# Patient Record
Sex: FEMALE | Race: WHITE | Employment: OTHER | URBAN - METROPOLITAN AREA
[De-identification: names, ages, dates, MRNs, and addresses within clinical notes are randomized per-mention and may not be internally consistent; named-entity substitution may affect disease eponyms.]

---

## 2018-01-29 LAB
ABO + RH BLD: NORMAL
ABO + RH BLD: NORMAL
BLD GP AB SCN SERPL QL: NEGATIVE
CULTURE MICRO: NORMAL
HBV SURFACE AG SERPL QL IA: NEGATIVE
HIV 1+2 AB+HIV1 P24 AG SERPL QL IA: NONREACTIVE
RUBELLA ABY IGG: 1.56
RUBELLA ANTIBODY IGG QUANTITATIVE: NORMAL IU/ML
T PALLIDUM IGG SER QL: NONREACTIVE

## 2018-04-02 ENCOUNTER — PRE VISIT (OUTPATIENT)
Dept: MATERNAL FETAL MEDICINE | Facility: CLINIC | Age: 38
End: 2018-04-02

## 2018-04-09 ENCOUNTER — OFFICE VISIT (OUTPATIENT)
Dept: MATERNAL FETAL MEDICINE | Facility: CLINIC | Age: 38
End: 2018-04-09
Attending: OBSTETRICS & GYNECOLOGY
Payer: COMMERCIAL

## 2018-04-09 ENCOUNTER — HOSPITAL ENCOUNTER (OUTPATIENT)
Dept: ULTRASOUND IMAGING | Facility: CLINIC | Age: 38
Discharge: HOME | End: 2018-04-09
Attending: OBSTETRICS & GYNECOLOGY | Admitting: OBSTETRICS & GYNECOLOGY
Payer: COMMERCIAL

## 2018-04-09 DIAGNOSIS — O26.90 PREGNANCY RELATED CONDITION, ANTEPARTUM: ICD-10-CM

## 2018-04-09 DIAGNOSIS — O09.522 ELDERLY MULTIGRAVIDA IN SECOND TRIMESTER: ICD-10-CM

## 2018-04-09 PROCEDURE — 76811 OB US DETAILED SNGL FETUS: CPT

## 2018-04-09 NOTE — MR AVS SNAPSHOT
After Visit Summary   4/9/2018    Nicky Dorman    MRN: [id number]           Patient Information     Date Of Birth          1980        Visit Information        Provider Department      4/9/2018 10:00 AM Cristiano Bar MD [facility] Maternal Fetal Medicine [facility]        Today's Diagnoses     Evaluate anatomy not seen on prior sonogram    -  1    Elderly multigravida in second trimester           Follow-ups after your visit        Your next 10 appointments already scheduled     Apr 30, 2018  8:45 AM CDT   (Arrive by 8:30 AM)   MFM US COMPRE SINGLE F/U with [id number]   [facility] Maternal Fetal Medicine Ultrasound - [facility] ([facility] [place])    [address] [place] [address]  [address]  [facility] [id number] [phone]           Wear comfortable clothes and leave your valuables at home.            Apr 30, 2018  9:15 AM CDT   [facility]   [facility] Maternal Fetal Medicine [facility] ([facility] [place])    [address]  [address]  [facility] [id number] [phone]           Please arrive at the time given for your first appointment. This visit is used internally to schedule the physician's time during your ultrasound.            Sep 04, 2018   Procedure with Nancy Oakes MD    Birthplace (--)    [address]  [facility] [id number] [phone]              Future tests that were ordered for you today     Open Future Orders        Priority Expected Expires Ordered    MFM US Comprehensive Single F/U Routine 4/30/2018 4/9/2019 4/9/2018            Who to contact     If you have questions or need follow up information about today's clinic visit or your schedule please contact [facility] MATERNAL FETAL MEDIC[facility] directly at [phone].  Normal or non-critical lab and imaging results will be communicated to you by MyChart, letter or phone within 4 business days after the clinic has received the results. If you do not  "hear from us within 7 days, please contact the clinic through [employer] or phone. If you have a critical or abnormal lab result, we will notify you by phone as soon as possible.  Submit refill requests through [employer] or call your pharmacy and they will forward the refill request to us. Please allow 3 business days for your refill to be completed.          Additional Information About Your Visit        [email or web address] Moderna Information     [employer] lets you send messages to your doctor, view your test results, renew your prescriptions, schedule appointments and more. To sign up, go to [email or web address] . Click on \"Log in\" on the left side of the screen, which will take you to the Welcome page. Then click on \"Sign up Now\" on the right side of the page.     You will be asked to enter the access code listed below, as well as some personal information. Please follow the directions to create your username and password.     Your access code is: [email or web address]  Expires: 2018 11:50 AM     Your access code will  in 90 days. If you need help or a new code, please call your [facility] clinic or [phone].        Care EveryWhere ID     This is your Care EveryWhere ID. This could be used by other organizations to access your [facility] medical records  [id number]        Your Vitals Were     Last Period                   2017            Blood Pressure from Last 3 Encounters:   No data found for BP    Weight from Last 3 Encounters:   No data found for Wt               Primary Care Provider Fax #    Physician No Ref-Primary [phone]       No address on file        Equal Access to Services     [facility] AH: Hadii aad ku hadasho Soomaali, waaxda luqadaha, qaybta kaalmada adeegyada, iesha mcintyre . So [facility] [phone].    ATENCIÓN: Si habla español, tiene a russell disposición servicios gratuitos de asistencia lingüística. Llame al [phone].    We comply with applicable federal civil rights laws and " [place] laws. We do not discriminate on the basis of race, color, national origin, age, disability, sex, sexual orientation, or gender identity.            Thank you!     Thank you for choosing [facility]  for your care. Our goal is always to provide you with excellent care. Hearing back from our patients is one way we can continue to improve our services. Please take a few minutes to complete the written survey that you may receive in the mail after your visit with us. Thank you!             Your Updated Medication List - Protect others around you: Learn how to safely use, store and throw away your medicines at www.disposemymeds.org.      Notice  As of 4/9/2018 11:50 AM    You have not been prescribed any medications.

## 2018-04-09 NOTE — PROGRESS NOTES
Please see full imaging report from ViewPoint program under imaging tab.        Cristiano Bar MD  Maternal Fetal Medicine

## 2018-04-30 ENCOUNTER — HOSPITAL ENCOUNTER (OUTPATIENT)
Dept: ULTRASOUND IMAGING | Facility: CLINIC | Age: 38
Discharge: HOME | End: 2018-04-30
Attending: OBSTETRICS & GYNECOLOGY | Admitting: OBSTETRICS & GYNECOLOGY
Payer: COMMERCIAL

## 2018-04-30 ENCOUNTER — OFFICE VISIT (OUTPATIENT)
Dept: MATERNAL FETAL MEDICINE | Facility: CLINIC | Age: 38
End: 2018-04-30
Attending: OBSTETRICS & GYNECOLOGY
Payer: COMMERCIAL

## 2018-04-30 DIAGNOSIS — Z03.73 SUSPECTED FETAL ANOMALY NOT FOUND: Primary | ICD-10-CM

## 2018-04-30 PROCEDURE — 76816 OB US FOLLOW-UP PER FETUS: CPT

## 2018-04-30 NOTE — MR AVS SNAPSHOT
"              After Visit Summary   4/30/2018    Nicky Dorman    MRN: [id number]           Patient Information     Date Of Birth          1980        Visit Information        Provider Department      4/30/2018 9:15 AM Saloni Mendes MD [facility] Maternal Fetal Medicine [facility]        Today's Diagnoses     Suspected fetal anomaly not found    -  1       Follow-ups after your visit        Your next 10 appointments already scheduled     Sep 04, 2018   Procedure with Nancy Oakes MD    Birthplace (--)    [address]  [facility] [address] [phone]              Who to contact     If you have questions or need follow up information about today's clinic visit or your schedule please contact [facility] MATERNAL FETAL MEDICINE [facility] directly at [phone].  Normal or non-critical lab and imaging results will be communicated to you by Digital[employer]ohart, letter or phone within 4 business days after the clinic has received the results. If you do not hear from us within 7 days, please contact the clinic through [employer] or phone. If you have a critical or abnormal lab result, we will notify you by phone as soon as possible.  Submit refill requests through [employer] or call your pharmacy and they will forward the refill request to us. Please allow 3 business days for your refill to be completed.          Additional Information About Your Visit        MyChart Information     [employer] lets you send messages to your doctor, view your test results, renew your prescriptions, schedule appointments and more. To sign up, go to [email or web address] . Click on \"Log in\" on the left side of the screen, which will take you to the Welcome page. Then click on \"Sign up Now\" on the right side of the page.     You will be asked to enter the access code listed below, as well as some personal information. Please follow the directions to create your username and password.     Your access code is: " [email or web address]  Expires: 2018 11:50 AM     Your access code will  in 90 days. If you need help or a new code, please call your [facility] clinic or [phone].        Care EveryWhere ID     This is your Care EveryWhere ID. This could be used by other organizations to access your [facility] medical records  [id number]        Your Vitals Were     Last Period                   2017            Blood Pressure from Last 3 Encounters:   No data found for BP    Weight from Last 3 Encounters:   No data found for Wt              Today, you had the following     No orders found for display       Primary Care Provider Fax #    Physician No Ref-Primary [phone]       No address on file        Equal Access to Services     JENNIFER WALKER : Huan Whittaker, eric olivas, jeromy rivas, iesha mcintyre . So [facility] [phone].    ATENCIÓN: Si habla español, tiene a russell disposición servicios gratuitos de asistencia lingüística. Llame al [phone].    We comply with applicable federal civil rights laws and [place] laws. We do not discriminate on the basis of race, color, national origin, age, disability, sex, sexual orientation, or gender identity.            Thank you!     Thank you for choosing [facility]  for your care. Our goal is always to provide you with excellent care. Hearing back from our patients is one way we can continue to improve our services. Please take a few minutes to complete the written survey that you may receive in the mail after your visit with us. Thank you!             Your Updated Medication List - Protect others around you: Learn how to safely use, store and throw away your medicines at www.disposemymeds.org.      Notice  As of 2018 11:59 PM    You have not been prescribed any medications.

## 2018-05-01 NOTE — PROGRESS NOTES
"Please see \"Imaging\" tab under \"Chart Review\" for details of today's visit.    Saloni Mendes    "

## 2018-08-13 LAB — GROUP B STREP PCR: POSITIVE

## 2018-09-03 ENCOUNTER — HOSPITAL ENCOUNTER (INPATIENT)
Facility: CLINIC | Age: 38
LOS: 2 days | Discharge: HOME HEALTH | End: 2018-09-05
Attending: OBSTETRICS & GYNECOLOGY | Admitting: OBSTETRICS & GYNECOLOGY
Payer: COMMERCIAL

## 2018-09-03 ENCOUNTER — ANESTHESIA (OUTPATIENT)
Dept: OBGYN | Facility: CLINIC | Age: 38
End: 2018-09-03
Payer: COMMERCIAL

## 2018-09-03 ENCOUNTER — ANESTHESIA EVENT (OUTPATIENT)
Dept: OBGYN | Facility: CLINIC | Age: 38
End: 2018-09-03
Payer: COMMERCIAL

## 2018-09-03 DIAGNOSIS — Z98.891 S/P CESAREAN SECTION: Primary | ICD-10-CM

## 2018-09-03 LAB
ABO + RH BLD: NORMAL
ABO + RH BLD: NORMAL
BLD GP AB SCN SERPL QL: NORMAL
BLOOD BANK CMNT PATIENT-IMP: NORMAL
HGB BLD-MCNC: 11.2 G/DL (ref 11.7–15.7)
SPECIMEN EXP DATE BLD: NORMAL

## 2018-09-03 PROCEDURE — 86850 RBC ANTIBODY SCREEN: CPT | Performed by: OBSTETRICS & GYNECOLOGY

## 2018-09-03 PROCEDURE — 86780 TREPONEMA PALLIDUM: CPT | Performed by: OBSTETRICS & GYNECOLOGY

## 2018-09-03 PROCEDURE — 85018 HEMOGLOBIN: CPT | Performed by: OBSTETRICS & GYNECOLOGY

## 2018-09-03 PROCEDURE — 25000128 H RX IP 250 OP 636: Performed by: OBSTETRICS & GYNECOLOGY

## 2018-09-03 PROCEDURE — 86901 BLOOD TYPING SEROLOGIC RH(D): CPT | Performed by: OBSTETRICS & GYNECOLOGY

## 2018-09-03 PROCEDURE — 25000128 H RX IP 250 OP 636

## 2018-09-03 PROCEDURE — 71000013 ZZH RECOVERY PHASE 1 LEVEL 1 EA ADDTL HR: Performed by: OBSTETRICS & GYNECOLOGY

## 2018-09-03 PROCEDURE — 71000012 ZZH RECOVERY PHASE 1 LEVEL 1 FIRST HR: Performed by: OBSTETRICS & GYNECOLOGY

## 2018-09-03 PROCEDURE — 25000125 ZZHC RX 250: Performed by: OBSTETRICS & GYNECOLOGY

## 2018-09-03 PROCEDURE — 59025 FETAL NON-STRESS TEST: CPT

## 2018-09-03 PROCEDURE — 25000128 H RX IP 250 OP 636: Performed by: NURSE ANESTHETIST, CERTIFIED REGISTERED

## 2018-09-03 PROCEDURE — 27210794 ZZH OR GENERAL SUPPLY STERILE: Performed by: OBSTETRICS & GYNECOLOGY

## 2018-09-03 PROCEDURE — G0463 HOSPITAL OUTPT CLINIC VISIT: HCPCS | Mod: 25

## 2018-09-03 PROCEDURE — 25000132 ZZH RX MED GY IP 250 OP 250 PS 637

## 2018-09-03 PROCEDURE — 25000132 ZZH RX MED GY IP 250 OP 250 PS 637: Performed by: OBSTETRICS & GYNECOLOGY

## 2018-09-03 PROCEDURE — 25000125 ZZHC RX 250: Performed by: NURSE ANESTHETIST, CERTIFIED REGISTERED

## 2018-09-03 PROCEDURE — 37000008 ZZH ANESTHESIA TECHNICAL FEE, 1ST 30 MIN: Performed by: OBSTETRICS & GYNECOLOGY

## 2018-09-03 PROCEDURE — 36000058 ZZH SURGERY LEVEL 3 EA 15 ADDTL MIN: Performed by: OBSTETRICS & GYNECOLOGY

## 2018-09-03 PROCEDURE — 36000056 ZZH SURGERY LEVEL 3 1ST 30 MIN: Performed by: OBSTETRICS & GYNECOLOGY

## 2018-09-03 PROCEDURE — 37000009 ZZH ANESTHESIA TECHNICAL FEE, EACH ADDTL 15 MIN: Performed by: OBSTETRICS & GYNECOLOGY

## 2018-09-03 PROCEDURE — 86900 BLOOD TYPING SEROLOGIC ABO: CPT | Performed by: OBSTETRICS & GYNECOLOGY

## 2018-09-03 PROCEDURE — 36415 COLL VENOUS BLD VENIPUNCTURE: CPT | Performed by: OBSTETRICS & GYNECOLOGY

## 2018-09-03 PROCEDURE — 12000037 ZZH R&B POSTPARTUM INTERMEDIATE

## 2018-09-03 RX ORDER — LIDOCAINE 40 MG/G
CREAM TOPICAL
Status: DISCONTINUED | OUTPATIENT
Start: 2018-09-03 | End: 2018-09-05 | Stop reason: HOSPADM

## 2018-09-03 RX ORDER — ACETAMINOPHEN 325 MG/1
TABLET ORAL
Status: DISCONTINUED
Start: 2018-09-03 | End: 2018-09-03 | Stop reason: HOSPADM

## 2018-09-03 RX ORDER — SODIUM CHLORIDE, SODIUM LACTATE, POTASSIUM CHLORIDE, CALCIUM CHLORIDE 600; 310; 30; 20 MG/100ML; MG/100ML; MG/100ML; MG/100ML
INJECTION, SOLUTION INTRAVENOUS CONTINUOUS
Status: DISCONTINUED | OUTPATIENT
Start: 2018-09-03 | End: 2018-09-03

## 2018-09-03 RX ORDER — EPHEDRINE SULFATE 50 MG/ML
5 INJECTION, SOLUTION INTRAMUSCULAR; INTRAVENOUS; SUBCUTANEOUS
Status: DISCONTINUED | OUTPATIENT
Start: 2018-09-03 | End: 2018-09-05 | Stop reason: HOSPADM

## 2018-09-03 RX ORDER — MISOPROSTOL 200 UG/1
400 TABLET ORAL
Status: DISCONTINUED | OUTPATIENT
Start: 2018-09-03 | End: 2018-09-05 | Stop reason: HOSPADM

## 2018-09-03 RX ORDER — AMOXICILLIN 250 MG
1 CAPSULE ORAL 2 TIMES DAILY PRN
Status: DISCONTINUED | OUTPATIENT
Start: 2018-09-03 | End: 2018-09-05 | Stop reason: HOSPADM

## 2018-09-03 RX ORDER — OXYTOCIN/0.9 % SODIUM CHLORIDE 30/500 ML
340 PLASTIC BAG, INJECTION (ML) INTRAVENOUS CONTINUOUS PRN
Status: DISCONTINUED | OUTPATIENT
Start: 2018-09-03 | End: 2018-09-05 | Stop reason: HOSPADM

## 2018-09-03 RX ORDER — ACETAMINOPHEN 325 MG/1
975 TABLET ORAL ONCE
Status: COMPLETED | OUTPATIENT
Start: 2018-09-03 | End: 2018-09-03

## 2018-09-03 RX ORDER — ONDANSETRON 2 MG/ML
4 INJECTION INTRAMUSCULAR; INTRAVENOUS EVERY 6 HOURS PRN
Status: DISCONTINUED | OUTPATIENT
Start: 2018-09-03 | End: 2018-09-03

## 2018-09-03 RX ORDER — BUPIVACAINE HYDROCHLORIDE 7.5 MG/ML
INJECTION, SOLUTION INTRASPINAL PRN
Status: DISCONTINUED | OUTPATIENT
Start: 2018-09-03 | End: 2018-09-03

## 2018-09-03 RX ORDER — KETOROLAC TROMETHAMINE 30 MG/ML
INJECTION, SOLUTION INTRAMUSCULAR; INTRAVENOUS
Status: COMPLETED
Start: 2018-09-03 | End: 2018-09-03

## 2018-09-03 RX ORDER — OXYCODONE HYDROCHLORIDE 5 MG/1
5-10 TABLET ORAL
Status: DISCONTINUED | OUTPATIENT
Start: 2018-09-03 | End: 2018-09-05 | Stop reason: HOSPADM

## 2018-09-03 RX ORDER — CEFAZOLIN SODIUM 2 G/100ML
2 INJECTION, SOLUTION INTRAVENOUS
Status: COMPLETED | OUTPATIENT
Start: 2018-09-03 | End: 2018-09-03

## 2018-09-03 RX ORDER — ONDANSETRON 2 MG/ML
4 INJECTION INTRAMUSCULAR; INTRAVENOUS EVERY 6 HOURS PRN
Status: DISCONTINUED | OUTPATIENT
Start: 2018-09-03 | End: 2018-09-05 | Stop reason: HOSPADM

## 2018-09-03 RX ORDER — CEFAZOLIN SODIUM 1 G/3ML
1 INJECTION, POWDER, FOR SOLUTION INTRAMUSCULAR; INTRAVENOUS SEE ADMIN INSTRUCTIONS
Status: DISCONTINUED | OUTPATIENT
Start: 2018-09-03 | End: 2018-09-03

## 2018-09-03 RX ORDER — LANOLIN 100 %
OINTMENT (GRAM) TOPICAL
Status: DISCONTINUED | OUTPATIENT
Start: 2018-09-03 | End: 2018-09-05 | Stop reason: HOSPADM

## 2018-09-03 RX ORDER — DEXTROSE, SODIUM CHLORIDE, SODIUM LACTATE, POTASSIUM CHLORIDE, AND CALCIUM CHLORIDE 5; .6; .31; .03; .02 G/100ML; G/100ML; G/100ML; G/100ML; G/100ML
INJECTION, SOLUTION INTRAVENOUS CONTINUOUS
Status: DISCONTINUED | OUTPATIENT
Start: 2018-09-03 | End: 2018-09-05 | Stop reason: HOSPADM

## 2018-09-03 RX ORDER — ONDANSETRON 2 MG/ML
INJECTION INTRAMUSCULAR; INTRAVENOUS PRN
Status: DISCONTINUED | OUTPATIENT
Start: 2018-09-03 | End: 2018-09-03

## 2018-09-03 RX ORDER — ACETAMINOPHEN 325 MG/1
975 TABLET ORAL EVERY 8 HOURS
Status: DISCONTINUED | OUTPATIENT
Start: 2018-09-03 | End: 2018-09-05 | Stop reason: HOSPADM

## 2018-09-03 RX ORDER — AMOXICILLIN 250 MG
2 CAPSULE ORAL 2 TIMES DAILY PRN
Status: DISCONTINUED | OUTPATIENT
Start: 2018-09-03 | End: 2018-09-05 | Stop reason: HOSPADM

## 2018-09-03 RX ORDER — KETOROLAC TROMETHAMINE 30 MG/ML
30 INJECTION, SOLUTION INTRAMUSCULAR; INTRAVENOUS EVERY 6 HOURS
Status: COMPLETED | OUTPATIENT
Start: 2018-09-03 | End: 2018-09-04

## 2018-09-03 RX ORDER — SIMETHICONE 80 MG
80 TABLET,CHEWABLE ORAL 4 TIMES DAILY PRN
Status: DISCONTINUED | OUTPATIENT
Start: 2018-09-03 | End: 2018-09-05 | Stop reason: HOSPADM

## 2018-09-03 RX ORDER — CEFAZOLIN SODIUM 2 G/100ML
INJECTION, SOLUTION INTRAVENOUS
Status: DISCONTINUED
Start: 2018-09-03 | End: 2018-09-03 | Stop reason: HOSPADM

## 2018-09-03 RX ORDER — OXYTOCIN/0.9 % SODIUM CHLORIDE 30/500 ML
PLASTIC BAG, INJECTION (ML) INTRAVENOUS
Status: COMPLETED
Start: 2018-09-03 | End: 2018-09-03

## 2018-09-03 RX ORDER — BISACODYL 10 MG
10 SUPPOSITORY, RECTAL RECTAL DAILY PRN
Status: DISCONTINUED | OUTPATIENT
Start: 2018-09-05 | End: 2018-09-05 | Stop reason: HOSPADM

## 2018-09-03 RX ORDER — HYDROCORTISONE 2.5 %
CREAM (GRAM) TOPICAL 3 TIMES DAILY PRN
Status: DISCONTINUED | OUTPATIENT
Start: 2018-09-03 | End: 2018-09-05 | Stop reason: HOSPADM

## 2018-09-03 RX ORDER — NALBUPHINE HYDROCHLORIDE 10 MG/ML
2.5-5 INJECTION, SOLUTION INTRAMUSCULAR; INTRAVENOUS; SUBCUTANEOUS EVERY 6 HOURS PRN
Status: DISCONTINUED | OUTPATIENT
Start: 2018-09-03 | End: 2018-09-05 | Stop reason: HOSPADM

## 2018-09-03 RX ORDER — NALOXONE HYDROCHLORIDE 0.4 MG/ML
.1-.4 INJECTION, SOLUTION INTRAMUSCULAR; INTRAVENOUS; SUBCUTANEOUS
Status: DISCONTINUED | OUTPATIENT
Start: 2018-09-03 | End: 2018-09-05 | Stop reason: HOSPADM

## 2018-09-03 RX ORDER — BUPIVACAINE HYDROCHLORIDE 7.5 MG/ML
INJECTION, SOLUTION INTRASPINAL
Status: DISCONTINUED
Start: 2018-09-03 | End: 2018-09-03 | Stop reason: HOSPADM

## 2018-09-03 RX ORDER — MORPHINE SULFATE 1 MG/ML
INJECTION, SOLUTION EPIDURAL; INTRATHECAL; INTRAVENOUS
Status: DISCONTINUED
Start: 2018-09-03 | End: 2018-09-03 | Stop reason: HOSPADM

## 2018-09-03 RX ORDER — CITRIC ACID/SODIUM CITRATE 334-500MG
SOLUTION, ORAL ORAL
Status: COMPLETED
Start: 2018-09-03 | End: 2018-09-03

## 2018-09-03 RX ORDER — OXYTOCIN/0.9 % SODIUM CHLORIDE 30/500 ML
PLASTIC BAG, INJECTION (ML) INTRAVENOUS CONTINUOUS PRN
Status: DISCONTINUED | OUTPATIENT
Start: 2018-09-03 | End: 2018-09-03

## 2018-09-03 RX ORDER — MORPHINE SULFATE 1 MG/ML
INJECTION, SOLUTION EPIDURAL; INTRATHECAL; INTRAVENOUS PRN
Status: DISCONTINUED | OUTPATIENT
Start: 2018-09-03 | End: 2018-09-03

## 2018-09-03 RX ORDER — OXYTOCIN 10 [USP'U]/ML
10 INJECTION, SOLUTION INTRAMUSCULAR; INTRAVENOUS
Status: DISCONTINUED | OUTPATIENT
Start: 2018-09-03 | End: 2018-09-05 | Stop reason: HOSPADM

## 2018-09-03 RX ORDER — OXYTOCIN/0.9 % SODIUM CHLORIDE 30/500 ML
100 PLASTIC BAG, INJECTION (ML) INTRAVENOUS CONTINUOUS
Status: DISCONTINUED | OUTPATIENT
Start: 2018-09-03 | End: 2018-09-05 | Stop reason: HOSPADM

## 2018-09-03 RX ORDER — HYDROMORPHONE HYDROCHLORIDE 1 MG/ML
.3-.5 INJECTION, SOLUTION INTRAMUSCULAR; INTRAVENOUS; SUBCUTANEOUS EVERY 30 MIN PRN
Status: DISCONTINUED | OUTPATIENT
Start: 2018-09-03 | End: 2018-09-05 | Stop reason: HOSPADM

## 2018-09-03 RX ORDER — CITRIC ACID/SODIUM CITRATE 334-500MG
30 SOLUTION, ORAL ORAL
Status: COMPLETED | OUTPATIENT
Start: 2018-09-03 | End: 2018-09-03

## 2018-09-03 RX ORDER — FERROUS SULFATE 325(65) MG
325 TABLET ORAL
Status: DISCONTINUED | OUTPATIENT
Start: 2018-09-03 | End: 2018-09-05 | Stop reason: HOSPADM

## 2018-09-03 RX ORDER — ACETAMINOPHEN 325 MG/1
650 TABLET ORAL EVERY 4 HOURS PRN
Status: DISCONTINUED | OUTPATIENT
Start: 2018-09-06 | End: 2018-09-05 | Stop reason: HOSPADM

## 2018-09-03 RX ORDER — IBUPROFEN 400 MG/1
800 TABLET, FILM COATED ORAL EVERY 6 HOURS PRN
Status: DISCONTINUED | OUTPATIENT
Start: 2018-09-03 | End: 2018-09-05 | Stop reason: HOSPADM

## 2018-09-03 RX ADMIN — Medication 340 ML/HR: at 05:22

## 2018-09-03 RX ADMIN — KETOROLAC TROMETHAMINE 30 MG: 30 INJECTION, SOLUTION INTRAMUSCULAR at 14:09

## 2018-09-03 RX ADMIN — SODIUM CHLORIDE, POTASSIUM CHLORIDE, SODIUM LACTATE AND CALCIUM CHLORIDE 1000 ML: 600; 310; 30; 20 INJECTION, SOLUTION INTRAVENOUS at 03:54

## 2018-09-03 RX ADMIN — KETOROLAC TROMETHAMINE 30 MG: 30 INJECTION, SOLUTION INTRAMUSCULAR at 07:10

## 2018-09-03 RX ADMIN — BUPIVACAINE HYDROCHLORIDE IN DEXTROSE 12 MG: 7.5 INJECTION, SOLUTION SUBARACHNOID at 05:00

## 2018-09-03 RX ADMIN — SODIUM CHLORIDE, POTASSIUM CHLORIDE, SODIUM LACTATE AND CALCIUM CHLORIDE: 600; 310; 30; 20 INJECTION, SOLUTION INTRAVENOUS at 04:17

## 2018-09-03 RX ADMIN — OXYTOCIN-SODIUM CHLORIDE 0.9% IV SOLN 30 UNIT/500ML 100 ML/HR: 30-0.9/5 SOLUTION at 09:41

## 2018-09-03 RX ADMIN — MORPHINE SULFATE 0.2 MG: 1 INJECTION, SOLUTION EPIDURAL; INTRATHECAL; INTRAVENOUS at 05:00

## 2018-09-03 RX ADMIN — ACETAMINOPHEN 975 MG: 325 TABLET, FILM COATED ORAL at 20:20

## 2018-09-03 RX ADMIN — Medication 30 ML: at 04:20

## 2018-09-03 RX ADMIN — SODIUM CITRATE AND CITRIC ACID MONOHYDRATE 30 ML: 500; 334 SOLUTION ORAL at 04:20

## 2018-09-03 RX ADMIN — KETOROLAC TROMETHAMINE 30 MG: 30 INJECTION, SOLUTION INTRAMUSCULAR at 18:17

## 2018-09-03 RX ADMIN — PHENYLEPHRINE HYDROCHLORIDE 100 MCG: 10 INJECTION, SOLUTION INTRAMUSCULAR; INTRAVENOUS; SUBCUTANEOUS at 05:13

## 2018-09-03 RX ADMIN — SODIUM CHLORIDE, SODIUM LACTATE, POTASSIUM CHLORIDE, CALCIUM CHLORIDE AND DEXTROSE MONOHYDRATE: 5; 600; 310; 30; 20 INJECTION, SOLUTION INTRAVENOUS at 15:14

## 2018-09-03 RX ADMIN — SODIUM CHLORIDE, POTASSIUM CHLORIDE, SODIUM LACTATE AND CALCIUM CHLORIDE: 600; 310; 30; 20 INJECTION, SOLUTION INTRAVENOUS at 04:25

## 2018-09-03 RX ADMIN — SENNOSIDES AND DOCUSATE SODIUM 1 TABLET: 8.6; 5 TABLET ORAL at 20:21

## 2018-09-03 RX ADMIN — PHENYLEPHRINE HYDROCHLORIDE 100 MCG: 10 INJECTION, SOLUTION INTRAMUSCULAR; INTRAVENOUS; SUBCUTANEOUS at 05:24

## 2018-09-03 RX ADMIN — SODIUM CHLORIDE, POTASSIUM CHLORIDE, SODIUM LACTATE AND CALCIUM CHLORIDE: 600; 310; 30; 20 INJECTION, SOLUTION INTRAVENOUS at 05:03

## 2018-09-03 RX ADMIN — CEFAZOLIN SODIUM 2 G: 2 INJECTION, SOLUTION INTRAVENOUS at 05:02

## 2018-09-03 RX ADMIN — ACETAMINOPHEN 975 MG: 325 TABLET, FILM COATED ORAL at 12:34

## 2018-09-03 RX ADMIN — ONDANSETRON 4 MG: 2 INJECTION INTRAMUSCULAR; INTRAVENOUS at 05:23

## 2018-09-03 RX ADMIN — ACETAMINOPHEN 975 MG: 325 TABLET, FILM COATED ORAL at 03:54

## 2018-09-03 ASSESSMENT — ACTIVITIES OF DAILY LIVING (ADL)
FALL_HISTORY_WITHIN_LAST_SIX_MONTHS: NO
RETIRED_COMMUNICATION: 0-->UNDERSTANDS/COMMUNICATES WITHOUT DIFFICULTY
AMBULATION: 0-->INDEPENDENT
RETIRED_EATING: 0-->INDEPENDENT
TOILETING: 0-->INDEPENDENT
SWALLOWING: 0-->SWALLOWS FOODS/LIQUIDS WITHOUT DIFFICULTY
BATHING: 0-->INDEPENDENT
COGNITION: 0 - NO COGNITION ISSUES REPORTED
TRANSFERRING: 0-->INDEPENDENT
DRESS: 0-->INDEPENDENT

## 2018-09-03 ASSESSMENT — ENCOUNTER SYMPTOMS: SEIZURES: 0

## 2018-09-03 ASSESSMENT — LIFESTYLE VARIABLES: TOBACCO_USE: 0

## 2018-09-03 NOTE — OP NOTE
Brief Op Note    PreOp Dx: 1. IUP at 39w1d, 2. Hx of C/S, desires repeat, 3. Active labor  PostOp Dx: Same  Procedure: 1. RLTCS via [place], 2. Scar excision  Surgeon: Melissa  Assistant: OR staff  Anesthesia: Spinal w/ Duramorph  EBL: 800cc  Complications: None apparent  Specimen: Cord blood  Findings: Liveborn male infant, cephalic. Wt 7-6, Apgars 8/9. Amniotic fluid with light meconium. Placenta intact with 3VC. Uterus, tubes, ovaries nl. Minimal scar tissue noted.    Tamara Torres MD  #[id number]

## 2018-09-03 NOTE — LACTATION NOTE
"Initial visit with Nicky, SARAH and baby boy.    Breastfeeding general information reviewed.   Advised to breastfeed exclusively, on demand, avoid pacifiers, bottles and formula unless medically indicated.  Encouraged rooming in, skin to skin, feeding on demand 8-12x/day or sooner if baby cues.  Explained benefits of holding and skin to skin.  Encouraged lots of skin to skin. Instructed on hand expression.   Continues to nurse well per mom. Nicky states she had a lower milk supply with her daughter \"nursed all the time\".  Nicky took Fenugreek.  Nicky has a Spectra pump at home and will follow up with Fariha.   No further questions at this time.   Will follow as needed.   Trini Son BSN, RN, PHN, RNC-MNN, IBCLC    "

## 2018-09-03 NOTE — IP AVS SNAPSHOT
MRN:[id number]                      After Visit Summary   9/3/2018    Nicky Dorman    MRN: [id number]           Thank you!     Thank you for choosing [facility] for your care. Our goal is always to provide you with excellent care. Hearing back from our patients is one way we can continue to improve our services. Please take a few minutes to complete the written survey that you may receive in the mail after you visit with us. Thank you!        Patient Information     Date Of Birth          1980        Designated Caregiver       Most Recent Value    Caregiver    Will someone help with your care after discharge? yes    Name of designated caregiver ok    Phone number of caregiver [phone]    Caregiver address same as pt      About your hospital stay     You were admitted on:  September 3, 2018 You last received care in the:  [address]    You were discharged on:  2018       Who to Call     For medical emergencies, please call 911.  For non-urgent questions about your medical care, please call your primary care provider or clinic, None  For questions related to your surgery, please call your surgery clinic        Attending Provider     Provider Specialty    Tamara Torres MD OB/Gyn       Primary Care Provider Fax #    Physician No Ref-Primary [phone]      After Care Instructions     Activity       Review discharge instructions            Diet       Resume previous diet            Discharge Instructions - Postpartum visit       Schedule postpartum visit with your provider and return to clinic in 2 and 8 weeks.                  Further instructions from your care team       Postop  Birth Instructions    Activity       Do not lift more than 10 pounds for 6 weeks after surgery.  Ask family and friends for help when you need it.    No driving until you have stopped taking your pain medications (usually two weeks after surgery).    No  heavy exercise or activity for 6 weeks.  Don't do anything that will put a strain on your surgery site.    Don't strain when using the toilet.  Your care team may prescribe a stool softener if you have problems with your bowel movements.     To care for your incision:       Keep the incision clean and dry.    Do not soak your incision in water. No swimming or hot tubs until it has fully healed. You may soak in the bathtub if the water level is below your incision.    Do not use peroxide, gel, cream, lotion, or ointment on your incision.    Adjust your clothes to avoid pressure on your surgery site (check the elastic in your underwear for example).     You may see a small amount of clear or pink drainage and this is normal.  Check with your health care provider:       If the drainage increases or has an odor.    If the incision reddens, you have swelling, or develop a rash.    If you have increased pain and the medicine we prescribed doesn't help.    If you have a fever above 100.4 F (38 C) with or without chills when placing thermometer under your tongue.   The area around your incision (surgery wound), will feel numb.  This is normal. The numbness should go away in less than a year.     Keep your hands clean:  Always wash your hands before touching your incision (surgery wound). This helps reduce your risk of infection. If your hands aren't dirty, you may use an alcohol hand-rub to clean your hands. Keep your nails clean and short.    Call your healthcare provider if you have any of these symptoms:       You soak a sanitary pad with blood within 1 hour, or you see blood clots larger than a golf ball.    Bleeding that lasts more than 6 weeks.    Vaginal discharge that smells bad.    Severe pain, cramping or tenderness in your lower belly area.    A need to urinate more frequently (use the toilet more often), more urgently (use the toilet very quickly), or it burns when you urinate.    Nausea and  "vomiting.    Redness, swelling or pain around a vein in your leg.    Problems breastfeeding or a red or painful area on your breast.    Chest pain and cough or are gasping for air.    Problems with coping with sadness, anxiety or depression. If you have concerns about hurting yourself or the baby, call your provider immediately.      You have questions or concerns after you return home.                  Pending Results     No orders found from 2018 to 2018.            Statement of Approval     Ordered          18 0837  I have reviewed and agree with all the recommendations and orders detailed in this document.  EFFECTIVE NOW     Comments:  DC  if baby able to be DC'd, otherwise DC .   Approved and electronically signed by:  Tamara Torres MD             Admission Information     Date & Time Provider Department Dept. Phone    9/3/2018 Tamara Torres MD [address] [phone]      Your Vitals Were     Blood Pressure Temperature Respirations Height Weight Last Period    111/61 98.4  F (36.9  C) (Oral) 16 1.829 m (6') 83.9 kg (185 lb) 2017    Pulse Oximetry BMI (Body Mass Index)                100% 25.09 kg/m2          MyChart Information     [employer] lets you send messages to your doctor, view your test results, renew your prescriptions, schedule appointments and more. To sign up, go to [email or web address] . Click on \"Log in\" on the left side of the screen, which will take you to the Welcome page. Then click on \"Sign up Now\" on the right side of the page.     You will be asked to enter the access code listed below, as well as some personal information. Please follow the directions to create your username and password.     Your access code is: [email or web address]  Expires: 2018  1:49 PM     Your access code will  in 90 days. If you need help or a new code, please call your [facility] clinic or [phone].        Care EveryWhere ID     This is " your Care EveryWhere ID. This could be used by other organizations to access your [facility] medical records  [id number]        Equal Access to Services     JENNIFER WALKER : Hadii aad ku hadarturwilman Whittaker, sandroda paulallanha, jeromy kashayla rivas, iesha may. So [facility] [phone].    ATENCIÓN: Si habla español, tiene a russell disposición servicios gratuitos de asistencia lingüística. Llame al [phone].    We comply with applicable federal civil rights laws and [place] laws. We do not discriminate on the basis of race, color, national origin, age, disability, sex, sexual orientation, or gender identity.               Review of your medicines      START taking        Dose / Directions    oxyCODONE IR 5 MG tablet   Commonly known as:  ROXICODONE        Dose:  5-10 mg   Take 1-2 tablets (5-10 mg) by mouth every 3 hours as needed for other (pain control or improvement in physical function. Hold dose for analgesic side effects.)   Quantity:  25 tablet   Refills:  0         CONTINUE these medicines which have NOT CHANGED        Dose / Directions    IRON SUPPLEMENT PO        Dose:  325 mg   Take 325 mg by mouth daily (with breakfast)   Refills:  0       PNV PO        Dose:  1 tablet   Take 1 tablet by mouth daily   Refills:  0            Where to get your medicines      Some of these will need a paper prescription and others can be bought over the counter. Ask your nurse if you have questions.     Bring a paper prescription for each of these medications     oxyCODONE IR 5 MG tablet                Protect others around you: Learn how to safely use, store and throw away your medicines at www.disposemymeds.org.        Information about OPIOIDS     PRESCRIPTION OPIOIDS: WHAT YOU NEED TO KNOW   We gave you an opioid (narcotic) pain medicine. It is important to manage your pain, but opioids are not always the best choice. You should first try all the other options your care team gave you. Take this  medicine for as short a time (and as few doses) as possible.    Some activities can increase your pain, such as bandage changes or therapy sessions. It may help to take your pain medicine 30 to 60 minutes before these activities. Reduce your stress by getting enough sleep, working on hobbies you enjoy and practicing relaxation or meditation. Talk to your care team about ways to manage your pain beyond prescription opioids.    These medicines have risks:    DO NOT drive when on new or higher doses of pain medicine. These medicines can affect your alertness and reaction times, and you could be arrested for driving under the influence (DUI). If you need to use opioids long-term, talk to your care team about driving.    DO NOT operate heavy machinery    DO NOT do any other dangerous activities while taking these medicines.    DO NOT drink any alcohol while taking these medicines.     If the opioid prescribed includes acetaminophen, DO NOT take with any other medicines that contain acetaminophen. Read all labels carefully. Look for the word  acetaminophen  or  Tylenol.  Ask your pharmacist if you have questions or are unsure.    You can get addicted to pain medicines, especially if you have a history of addiction (chemical, alcohol or substance dependence). Talk to your care team about ways to reduce this risk.    All opioids tend to cause constipation. Drink plenty of water and eat foods that have a lot of fiber, such as fruits, vegetables, prune juice, apple juice and high-fiber cereal. Take a laxative (Miralax, milk of magnesia, Colace, Senna) if you don t move your bowels at least every other day. Other side effects include upset stomach, sleepiness, dizziness, throwing up, tolerance (needing more of the medicine to have the same effect), physical dependence and slowed breathing.    Store your pills in a secure place, locked if possible. We will not replace any lost or stolen medicine. If you don t finish your  medicine, please throw away (dispose) as directed by your pharmacist. The [place] Pollution Control Agency has more information about safe disposal: https://www.pca.Count includes the [facility].[place].us/living-green/managing-unwanted-medications             Medication List: This is a list of all your medications and when to take them. Check marks below indicate your daily home schedule. Keep this list as a reference.      Medications           Morning Afternoon Evening Bedtime As Needed    IRON SUPPLEMENT PO   Take 325 mg by mouth daily (with breakfast)   Last time this was given:  325 mg on 9/5/2018  9:19 AM                                oxyCODONE IR 5 MG tablet   Commonly known as:  ROXICODONE   Take 1-2 tablets (5-10 mg) by mouth every 3 hours as needed for other (pain control or improvement in physical function. Hold dose for analgesic side effects.)   Last time this was given:  5 mg on 9/5/2018  1:28 PM                                PNV PO   Take 1 tablet by mouth daily

## 2018-09-03 NOTE — PLAN OF CARE
Data: Patient presented to [facility] at 0315.   Reason for maternal/fetal assessment per patient is Contractions  Patient is a . Prenatal record reviewed.   Gestational Age 39w1d. VSS. Fetal movement present. Patient states that her contractions started around midnight and that they have been getting closer and stronger. Patient denies vaginal discharge, pelvic pressure, UTI symptoms, GI problems, bloody show, vaginal bleeding, edema, headache, visual disturbances, epigastric or URQ pain, abdominal pain, rupture of membranes. Support person present.  Action: Verbal consent for EFM. Triage assessment completed. EFM applied for fetal wellbeing. Uterine assessment completed; contractions noted about every 2-4 minutes, moderate and patient breathing through them. SVE. Unable to assess dilation as patient's cervix very posterior and fetal station very low. Fetal assessment: Presumed adequate fetal oxygenation documented (see flow record).   Response: Dr. Torres informed of patient arrival, contractions, attempted SVE, and FHR tracing. Plan per provider is admit and prepare for repeat c/s. Patient verbalized agreement with plan. Report given to JANET Padilla RN.

## 2018-09-03 NOTE — OP NOTE
Procedure Date: 2018      DATE OF SERVICE: 2018.      PREOPERATIVE DIAGNOSES:   1.  Intrauterine pregnancy at 39 +1 weeks.   2.  History of  section, desires repeat.   3.  Active labor.      POSTOPERATIVE DIAGNOSES:   1.  Intrauterine pregnancy at 39 +1 weeks.   2.  History of  section, desires repeat.   3.  Active labor.      PROCEDURES:   1.  Repeat low transverse  section via Pfannenstiel.   2.  Scar excision.      SURGEON:  Tamara Torres MD      ASSISTANT:  OR staff.        ANESTHESIA:  Spinal with Duramorph.      ESTIMATED BLOOD LOSS: Was 800 mL.      COMPLICATIONS:  None apparent.      SPECIMENS:  Cord blood.      OPERATIVE INDICATIONS:  The patient is a 38-year-old G3, para 1-0-1-1, at 39 +1 weeks.  She had a history of  section with her first pregnancy due to breech presentation and planned repeat.  However, she presented to Labor and Delivery in active labor.  Risks, benefits and alternatives were discussed, including the risk of bleeding, infection and damage to surrounding structures such as bowel, bladder, blood vessels, etcetera.  All questions were answered and informed consent was obtained.  The patient desired to proceed.      FINDINGS:  Liveborn male infant in the cephalic presentation, weight 7 pounds 6 ounces, Apgars 8 and 9 at 1 and 5 minutes respectively.  Amniotic fluid was noted to have light meconium staining.  Placenta was intact with a 3-vessel cord.  Uterus, tubes and ovaries were normal.  There was minimal scar tissue noted.      DESCRIPTION OF PROCEDURE:  The patient was taken to the operating room where spinal anesthesia was undertaken.  She was then prepped and draped in the dorsal supine position with a leftward tilt.  Anesthesia was tested and found to be adequate.  Pfannenstiel skin incision was made with a scalpel excising the previous scar and the incision was extended to the level of the fascia.  Fascia was incised in the midline and  the incision extended laterally.  The fascial incision was elevated, and rectus muscles dissected off sharply and bluntly.  Rectus muscles were  at the midline and the peritoneum was identified and entered sharply.  Peritoneal incision was extended with good visualization of the bladder.  Bladder blade was inserted. A transverse uterine incision was made with the scalpel and extended bluntly.  Amniotomy was performed with an Allis clamp.  The infant was delivered from the cephalic presentation without difficulty.  Nose and mouth were bulb suctioned and cord was clamped and cut after a 30 second delay.  The placenta was then manually expressed and the uterus was exteriorized and cleared of all clots and debris.  The uterine incisions were reapproximated using 0 Vicryl in a running locked fashion.  A second layer of 0 Monocryl was placed in a horizontal imbricating fashion.  There was a small amount of bleeding noted at the right corner of the incision, which was hemostatic after a figure-of-eight of 0 Vicryl was placed.  Posterior cul-de-sac was irrigated and the uterus returned to the abdomen.  The gutters were irrigated and cleared of all clots.  The uterine incision was again reinspected and hemostasis ensured.  The peritoneum was brought together using 3-0 Vicryl in a running stitch.  Rectus muscles were inspected and made hemostatic with electrocautery.  Rectus muscles were then brought together using 0 Vicryl in interrupted sutures.  Fascial incision was brought together using 0 Vicryl in a running stitch.  Subcutaneous tissue was copiously irrigated and made hemostatic with electrocautery.  Subcutaneous tissue was then brought together using 3-0 chromic.  Skin was closed with staples.  Steri-Strips were placed between each.  The patient tolerated the procedure well.  All sponge, lap and instrument counts were correct x3.  She was taken to recovery room in stable condition.  She received 2 grams of  Ancef prior to the procedure for prophylaxis.  She is PENICILLIN ALLERGIC WITH HIVES  and tolerated a test dose of Ancef well.         TIFFANIE WHITE MD             D: 2018   T: 2018   MT: JOHN      Name:     JAYLA DE LA GARZA   MRN:      [id number]        Account:        [id number]   :      1980           Procedure Date: 2018      Document: [id number]

## 2018-09-03 NOTE — IP AVS SNAPSHOT
[address]    [place] [address]    Phone:  [phone]                                       After Visit Summary   9/3/2018    Nicky Dorman    MRN: [id number]           After Visit Summary Signature Page     I have received my discharge instructions, and my questions have been answered. I have discussed any challenges I see with this plan with the nurse or doctor.    ..........................................................................................................................................  Patient/Patient Representative Signature      ..........................................................................................................................................  Patient Representative Print Name and Relationship to Patient    ..................................................               ................................................  Date                                            Time    ..........................................................................................................................................  Reviewed by Signature/Title    ...................................................              ..............................................  Date                                                            Time          22EPIC Rev 08/18

## 2018-09-03 NOTE — H&P
H&P Update    Prenatal chart and H&P reviewed, no changes.    37yo  at 39w1d admitted in spontaneous labor. Hx of C/S with first pregnancy for breech, repeat planned for tomorrow. Discussed R/B including risk of bleeding, infxn, damage to surrounding structures (bowel, bladder, blood vessels, etc). All questions answered and informed consent obtained. Will proceed to OR ASAP for C/S.    Tamara Torres MD

## 2018-09-03 NOTE — ANESTHESIA CARE TRANSFER NOTE
Patient: Nicky Ponce Root    Procedure(s):  REPEAT  SECTION - Wound Class: II-Clean Contaminated    Diagnosis: PREVIOUS  Diagnosis Additional Information: No value filed.    Anesthesia Type:   Spinal     Note:  Airway :Room Air  Patient transferred to:Labor and Delivery  Comments: Transferred to RN. Patient awake and verbal. Spontaneous resp and on room air. Monitors and alarms on. VSS. Report given.      Vitals: (Last set prior to Anesthesia Care Transfer)    CRNA VITALS  9/3/2018 0528 - 9/3/2018 0606      9/3/2018             Resp Rate (set): 10                Electronically Signed By: ANIBAL Lee CRNA  September 3, 2018  6:06 AM

## 2018-09-03 NOTE — PLAN OF CARE
Problem: Patient Care Overview  Goal: Plan of Care/Patient Progress Review  Outcome: No Change  Pt. arrived on floor at 0800, oriented to unit and safety protocols. IV Pitocin infusing, gomez draining clear, narciso urine. Tolerated full liquid lunch. Drsg. with shadow drainage. Pt. up to BR with SBA, tolerated well. Vielka care done. Breastfeeding a baby boy with assist to position and check latch. Toradol and Tylenol for pain.

## 2018-09-03 NOTE — ANESTHESIA PREPROCEDURE EVALUATION
Anesthesia Evaluation     . Pt has had prior anesthetic.     No history of anesthetic complications          ROS/MED HX    ENT/Pulmonary:      (-) tobacco use and sleep apnea   Neurologic:      (-) seizures   Cardiovascular:        (-) hypertension   METS/Exercise Tolerance:     Hematologic:         Musculoskeletal:         GI/Hepatic:        (-) GERD and liver disease   Renal/Genitourinary:      (-) renal disease   Endo:      (-) Type II DM and thyroid disease   Psychiatric:         Infectious Disease:         Malignancy:         Other:                     Physical Exam  Normal systems: cardiovascular, pulmonary and dental    Airway   Mallampati: I  TM distance: >3 FB  Neck ROM: full    Dental     Cardiovascular       Pulmonary                     Anesthesia Plan      History & Physical Review  History and physical reviewed and following examination; no interval change.    ASA Status:  2 .    NPO Status:  > 8 hours    Plan for Spinal   PONV prophylaxis:  Ondansetron (or other 5HT-3)       Postoperative Care  Postoperative pain management:  Neuraxial analgesia, Multi-modal analgesia and Oral pain medications.      Consents  Anesthetic plan, risks, benefits and alternatives discussed with:  Spouse and Patient..        Procedure: Procedure(s):   SECTION  Preop diagnosis: PREVIOUS    Allergies   Allergen Reactions     Penicillins Hives     Past Medical History:   Diagnosis Date     Anemia      Past Surgical History:   Procedure Laterality Date      SECTION       wisdom teeth       Prior to Admission medications    Medication Sig Start Date End Date Taking? Authorizing Provider   Ferrous Sulfate (IRON SUPPLEMENT PO) Take 325 mg by mouth daily (with breakfast)   Yes Reported, Patient   Prenatal Vit w/Yz-Ndnxqnsfk-YL (PNV PO) Take 1 tablet by mouth daily   Yes Reported, Patient     Current Facility-Administered Medications Ordered in Epic   Medication Dose Route Frequency Last Rate Last Dose      acetaminophen (TYLENOL) 325 MG tablet             ceFAZolin (ANCEF) 1 g vial to attach to  ml bag for ADULT or 50 ml bag for PEDS  1 g Intravenous See Admin Instructions         ceFAZolin (ANCEF) intermittent infusion 2 g in 100 mL dextrose PRE-MIX  2 g Intravenous Pre-Op/Pre-procedure x 1 dose         ceFAZolin-dextrose (ANCEF) 2-4 GM/100ML-% infusion             lactated ringers infusion   Intravenous Continuous         NO Rho (D) immune globulin (RhoGam) needed - mother Rh POSITIVE   Does not apply Continuous PRN         ondansetron (ZOFRAN) injection 4 mg  4 mg Intravenous Q6H PRN         sodium citrate-citric acid (BICITRA) 500-334 MG/5ML solution             sodium citrate-citric acid (BICITRA) solution 30 mL  30 mL Oral Pre-Op/Pre-procedure x 1 dose         No current Epic-ordered outpatient prescriptions on file.     Wt Readings from Last 1 Encounters:   09/03/18 83.9 kg (185 lb)     Temp Readings from Last 1 Encounters:   No data found for Temp     BP Readings from Last 6 Encounters:   No data found for BP     Pulse Readings from Last 4 Encounters:   No data found for Pulse     Resp Readings from Last 1 Encounters:   No data found for Resp     SpO2 Readings from Last 1 Encounters:   No data found for SpO2     No results for input(s): NA, POTASSIUM, CHLORIDE, CO2, ANIONGAP, GLC, BUN, CR, DULCE MARIA in the last 01057 hours.  Recent Labs   Lab Test  09/03/18   0350   HGB  11.2*     No results for input(s): INR in the last 55304 hours.    Invalid input(s): APTT   RECENT LABS:   ECG:   ECHO:   CXR:                      .

## 2018-09-03 NOTE — ANESTHESIA PROCEDURE NOTES
Peripheral nerve/Neuraxial procedure note : intrathecal  Pre-Procedure  Performed by ADRIANA VILLA  Location: OR, OB      Pre-Anesthestic Checklist: patient identified, IV checked, site marked, risks and benefits discussed, informed consent, monitors and equipment checked, pre-op evaluation, at physician/surgeon's request and post-op pain management    Timeout  Correct Patient: Yes   Correct Procedure: Yes   Correct Site: Yes   Correct Laterality: Yes   Correct Position: Yes   Site Marked: Yes   .   Procedure Documentation    .    Procedure:    Intrathecal.  Insertion Site:L3-4  (midline approach)      Patient Prep;mask, sterile gloves, povidone-iodine 7.5% surgical scrub, patient draped.  .  Needle: Mary tip Spinal Needle (gauge): 25  Spinal/LP Needle Length (inches): 3 # of attempts: 1 and # of redirects:  Introducer used .       Assessment/Narrative  Paresthesias: No.  .  .  clear CSF fluid removed . Comments:  No complications

## 2018-09-03 NOTE — ANESTHESIA POSTPROCEDURE EVALUATION
Patient: Nicky Ponce Root    Procedure(s):  REPEAT  SECTION - Wound Class: II-Clean Contaminated    Diagnosis:PREVIOUS  Diagnosis Additional Information: No value filed.    Anesthesia Type:  Spinal    Note:  Anesthesia Post Evaluation    Patient location during evaluation: OB PACU  Patient participation: Able to fully participate in evaluation  Level of consciousness: awake and alert  Pain management: satisfactory to patient  Airway patency: patent  Cardiovascular status: hemodynamically stable  Respiratory status: acceptable and unassisted  Hydration status: balanced  PONV: none     Anesthetic complications: None          Last vitals:  Vitals:    18 0603 18 0615 18 0630   BP: 113/67 103/53 107/57   Resp: 11 8 10   Temp:      SpO2: 99% 96% 96%         Electronically Signed By: Emil Montano MD  September 3, 2018  6:41 AM

## 2018-09-04 LAB
HGB BLD-MCNC: 10.3 G/DL (ref 11.7–15.7)
T PALLIDUM AB SER QL: NONREACTIVE

## 2018-09-04 PROCEDURE — 36415 COLL VENOUS BLD VENIPUNCTURE: CPT | Performed by: OBSTETRICS & GYNECOLOGY

## 2018-09-04 PROCEDURE — 25000132 ZZH RX MED GY IP 250 OP 250 PS 637: Performed by: OBSTETRICS & GYNECOLOGY

## 2018-09-04 PROCEDURE — 85018 HEMOGLOBIN: CPT | Performed by: OBSTETRICS & GYNECOLOGY

## 2018-09-04 PROCEDURE — 25000128 H RX IP 250 OP 636: Performed by: OBSTETRICS & GYNECOLOGY

## 2018-09-04 PROCEDURE — 12000037 ZZH R&B POSTPARTUM INTERMEDIATE

## 2018-09-04 RX ADMIN — OXYCODONE HYDROCHLORIDE 5 MG: 5 TABLET ORAL at 09:06

## 2018-09-04 RX ADMIN — IBUPROFEN 800 MG: 400 TABLET ORAL at 11:54

## 2018-09-04 RX ADMIN — OXYCODONE HYDROCHLORIDE 5 MG: 5 TABLET ORAL at 13:03

## 2018-09-04 RX ADMIN — ACETAMINOPHEN 975 MG: 325 TABLET, FILM COATED ORAL at 13:03

## 2018-09-04 RX ADMIN — IBUPROFEN 800 MG: 400 TABLET ORAL at 18:06

## 2018-09-04 RX ADMIN — SENNOSIDES AND DOCUSATE SODIUM 1 TABLET: 8.6; 5 TABLET ORAL at 20:10

## 2018-09-04 RX ADMIN — ACETAMINOPHEN 975 MG: 325 TABLET, FILM COATED ORAL at 20:10

## 2018-09-04 RX ADMIN — ACETAMINOPHEN 975 MG: 325 TABLET, FILM COATED ORAL at 04:22

## 2018-09-04 RX ADMIN — FERROUS SULFATE TAB 325 MG (65 MG ELEMENTAL FE) 325 MG: 325 (65 FE) TAB at 09:04

## 2018-09-04 RX ADMIN — SENNOSIDES AND DOCUSATE SODIUM 1 TABLET: 8.6; 5 TABLET ORAL at 09:04

## 2018-09-04 RX ADMIN — KETOROLAC TROMETHAMINE 30 MG: 30 INJECTION, SOLUTION INTRAMUSCULAR at 00:05

## 2018-09-04 RX ADMIN — OXYCODONE HYDROCHLORIDE 5 MG: 5 TABLET ORAL at 21:25

## 2018-09-04 RX ADMIN — IBUPROFEN 800 MG: 400 TABLET ORAL at 05:38

## 2018-09-04 NOTE — PLAN OF CARE
Problem: Patient Care Overview  Goal: Plan of Care/Patient Progress Review  Outcome: Improving  Pt. stable, Tylenol, Ibuprofen and Oxycodone for pain. Inc. intact with staples and steri strips. Had a shower this am, abd. binder given to pt. She has amb. small distances in the adkins. Breastfeeding her baby boy ind. Lanolin for tender nipples. Hgb. 10.3. On oral iron daily for anemia hx.

## 2018-09-04 NOTE — PLAN OF CARE
Problem: Patient Care Overview  Goal: Plan of Care/Patient Progress Review  Outcome: Improving  Vital signs stable.  Unable to visualize incision; drainage on dressing marked by previous RN; no change.  Up to bathroom independently, voiding well, IV removed.  Pain well controlled, requesting prn pain meds as needed.  Working on breastfeeding  and  cares.  Questions and concerns addressed. Continue to monitor and notify MD as needed.

## 2018-09-04 NOTE — PLAN OF CARE
Problem: Patient Care Overview  Goal: Plan of Care/Patient Progress Review  Outcome: No Change  Pt ambulated to BR and in hallway. Tolerated well. IV SL'd. Drinking adequately. Sandee JACOBSON'd at 2050. DTV. Regular diet with no N/V. Good pain control with tylenol and toradol. Nursing going well when baby awake and interested.

## 2018-09-05 ENCOUNTER — DOCUMENTATION ONLY (OUTPATIENT)
Dept: CARE COORDINATION | Facility: CLINIC | Age: 38
End: 2018-09-05

## 2018-09-05 VITALS
BODY MASS INDEX: 25.06 KG/M2 | WEIGHT: 185 LBS | DIASTOLIC BLOOD PRESSURE: 61 MMHG | TEMPERATURE: 98.4 F | HEIGHT: 72 IN | RESPIRATION RATE: 16 BRPM | SYSTOLIC BLOOD PRESSURE: 111 MMHG | OXYGEN SATURATION: 100 %

## 2018-09-05 PROCEDURE — 25000132 ZZH RX MED GY IP 250 OP 250 PS 637: Performed by: OBSTETRICS & GYNECOLOGY

## 2018-09-05 RX ORDER — OXYCODONE HYDROCHLORIDE 5 MG/1
5-10 TABLET ORAL
Qty: 25 TABLET | Refills: 0 | Status: ON HOLD | OUTPATIENT
Start: 2018-09-05 | End: 2020-01-19

## 2018-09-05 RX ADMIN — SENNOSIDES AND DOCUSATE SODIUM 1 TABLET: 8.6; 5 TABLET ORAL at 09:18

## 2018-09-05 RX ADMIN — IBUPROFEN 800 MG: 400 TABLET ORAL at 06:39

## 2018-09-05 RX ADMIN — IBUPROFEN 800 MG: 400 TABLET ORAL at 12:38

## 2018-09-05 RX ADMIN — FERROUS SULFATE TAB 325 MG (65 MG ELEMENTAL FE) 325 MG: 325 (65 FE) TAB at 09:19

## 2018-09-05 RX ADMIN — OXYCODONE HYDROCHLORIDE 5 MG: 5 TABLET ORAL at 13:28

## 2018-09-05 RX ADMIN — OXYCODONE HYDROCHLORIDE 5 MG: 5 TABLET ORAL at 09:18

## 2018-09-05 RX ADMIN — IBUPROFEN 800 MG: 400 TABLET ORAL at 00:01

## 2018-09-05 RX ADMIN — OXYCODONE HYDROCHLORIDE 5 MG: 5 TABLET ORAL at 04:10

## 2018-09-05 RX ADMIN — ACETAMINOPHEN 975 MG: 325 TABLET, FILM COATED ORAL at 12:39

## 2018-09-05 RX ADMIN — OXYCODONE HYDROCHLORIDE 5 MG: 5 TABLET ORAL at 01:07

## 2018-09-05 RX ADMIN — ACETAMINOPHEN 975 MG: 325 TABLET, FILM COATED ORAL at 04:10

## 2018-09-05 NOTE — PROGRESS NOTES
POD#2    S: Doing well. Pain controlled. Voiding and ambulating. Tolerating regular diet. Bleeding is minimal. Would like to go home if baby able to be DC'd.    O: /61  Temp 98.4  F (36.9  C) (Oral)  Resp 16  Ht 1.829 m (6')  Wt 83.9 kg (185 lb)  LMP 12/03/2017  SpO2 100%  Breastfeeding? Unknown  BMI 25.09 kg/m2  Gen - NAD  Abd - FF, NT  Inc - C/D/I  Ext - NT    Hgb 10.3    A/P: 37yo POD#2 s/p LTCS  1. Routine cares  2. Home today if baby able to be DC'd, otherwise plan DC tomorrow    Tamara Torres MD

## 2018-09-05 NOTE — PROGRESS NOTES
[facility] will be sharing updates with you on Maternal Child Health Referral requests for home care services.  This is for care coordination purposes and alert you to referral status.  We received the referral for  Nicky Dorman; MRN [id number] and want to update you:    [facility] is unable to see patient for postpartum/  assessment and education due to patient insurance not contracted with [facility] for this service.  Patient advised to contact their insurance provider to determine if service is covered through another homecare agency. Offered option of private pay nurse assessment and education for mom and baby at service rate of 180.00 per couplet.  Provided call back information if private pay visit is requested. Patient declined need for home care visit, they will be seen in clinic tomorrow.     Sincerely [facility]  [place]  [phone]

## 2018-09-05 NOTE — PLAN OF CARE
Problem: Patient Care Overview  Goal: Plan of Care/Patient Progress Review  Outcome: Improving  Vital signs stable.  Incision clean, dry, intact with staples and steri strips.  Up independently.  Pain well controlled, requesting prn pain meds as needed.  Cluster feeding  and started pumping  0045. Hoping to discharge today. Questions and concerns addressed. Continue to monitor and notify MD as needed.

## 2018-09-05 NOTE — PLAN OF CARE
Problem: Patient Care Overview  Goal: Plan of Care/Patient Progress Review  Outcome: Adequate for Discharge Date Met: 09/05/18  D: VSS, assessments WDL.   I: Pt. received complete discharge paperwork and home medications as filled by discharge pharmacy.  Pt. was given times of last dose for all discharge medications in writing on discharge medication sheets.  Discharge teaching included home medication, pain management, activity restrictions, postpartum cares, and signs and symptoms of infection.    A: Discharge outcomes on care plan met.  Mother states understanding and comfort with self and baby cares.  P: Pt. discharged to home.  Pt. was discharged with baby, and bands were checked at time of discharge.  Pt. was accompanied by , nurse and baby, and left with personal belongings.  Home care called.  Pt. to follow up with OB per MD order.  Pt. had no further questions at the time of discharge and no unmet needs were identified.

## 2018-09-05 NOTE — PLAN OF CARE
Problem: Patient Care Overview  Goal: Plan of Care/Patient Progress Review  Outcome: Improving  VSS. Fundus firm and midline. Scant flow. Pain 5/10, taking tylenol, ibuprofen, and oxycodone. Incision open to air. Breastfeeding. Ambulating free of dizziness and lightheaded. Voiding without difficulty. Encouraged to call with needs, questions, or concerns. Will continue to monitor.

## 2018-09-14 NOTE — DISCHARGE SUMMARY
[facility] Discharge Summary    Nicky Dorman MRN# [id number]   Age: 38 year old YOB: 1980     Date of Admission:  9/3/2018  Date of Discharge::  18  Admitting Physician:  Tamara Torres MD     Home clinic: Obstetrics, Gynecology, and Infertility          Admission Diagnoses:   1. Intrauterine pregnancy at 39w1d   2. Hx of C/S, desires repeat  3. Active labor          Discharge Diagnosis:   1.  delivery at 39w1d   2. Same, delivered          Procedures:   Repeat low transverse  section via pfannenstiel skin incision with 2 layer uterine closure           Medications Prior to Admission:     No prescriptions prior to admission.             Discharge Medications:     Discharge Medication List as of 2018  1:49 PM      START taking these medications    Details   oxyCODONE IR (ROXICODONE) 5 MG tablet Take 1-2 tablets (5-10 mg) by mouth every 3 hours as needed for other (pain control or improvement in physical function. Hold dose for analgesic side effects.), Disp-25 tablet, R-0, Local Print         CONTINUE these medications which have NOT CHANGED    Details   Ferrous Sulfate (IRON SUPPLEMENT PO) Take 325 mg by mouth daily (with breakfast), Historical      Prenatal Vit w/Jr-Ayyfhqwlv-PI (PNV PO) Take 1 tablet by mouth daily, Historical                    Brief History of Labor or Admission:   Nicky Dorman is a 38 year old  who was admitted at 39w1d for spontaneous labor and history of C/S.           Hospital Course:   The patient's hospital course was unremarkable.  She recovered as anticipated and experienced no post-operative complications. On discharge, her pain was well controlled. Vaginal bleeding is similar to peak menstrual flow.  Voiding without difficulty.  Ambulating well and tolerating a normal diet.  No fever or significant wound drainage.  Breastfeeding well.  Infant is stable.  No bowel movement yet.  She was discharged on post-partum day #3.      Post-partum hemoglobin:   Hemoglobin   Date Value Ref Range Status   09/04/2018 10.3 (L) 11.7 - 15.7 g/dL Final             Discharge Instructions and Follow-Up:   Discharge diet: Regular   Discharge activity: No heavy lifting, pushing, pulling for 6 week(s)  No driving or operating machinery while on narcotic analgesics  Pelvic rest: abstain from intercourse and do not use tampons for 6 week(s)   Discharge follow-up: Incision check in 2 weeks  Routine postpartum visit in 6 weeks   Wound care: Keep wound clean and dry  May shower but do not soak incision or scrub  Steri-strips off in 7 days             Discharge Disposition:   Discharged to home      Tamara HENDRICKSON

## 2018-11-16 ENCOUNTER — HOSPITAL ENCOUNTER (OUTPATIENT)
Facility: CLINIC | Age: 38
End: 2018-11-16
Attending: OBSTETRICS & GYNECOLOGY | Admitting: OBSTETRICS & GYNECOLOGY
Payer: COMMERCIAL

## 2019-03-18 ENCOUNTER — HOSPITAL PATHOLOGY (OUTPATIENT)
Dept: OTHER | Facility: CLINIC | Age: 39
End: 2019-03-18

## 2019-03-19 LAB — COPATH REPORT: NORMAL

## 2019-06-24 LAB
ABO + RH BLD: NORMAL
ABO + RH BLD: NORMAL
BLD GP AB SCN SERPL QL: NORMAL
HBV SURFACE AG SERPL QL IA: NEGATIVE
HIV 1+2 AB+HIV1 P24 AG SERPL QL IA: NORMAL
RUBELLA ABY IGG: 1.6
RUBELLA ANTIBODY IGG QUANTITATIVE: NORMAL IU/ML
TREPONEMA ANTIBODIES: NORMAL

## 2019-07-29 ENCOUNTER — TRANSCRIBE ORDERS (OUTPATIENT)
Dept: MATERNAL FETAL MEDICINE | Facility: CLINIC | Age: 39
End: 2019-07-29

## 2019-07-29 DIAGNOSIS — O26.90 PREGNANCY RELATED CONDITION, ANTEPARTUM: Primary | ICD-10-CM

## 2019-08-12 LAB — CULTURE MICRO: NORMAL

## 2019-08-29 ENCOUNTER — TRANSFERRED RECORDS (OUTPATIENT)
Dept: HEALTH INFORMATION MANAGEMENT | Facility: CLINIC | Age: 39
End: 2019-08-29

## 2019-08-29 ENCOUNTER — PRE VISIT (OUTPATIENT)
Dept: MATERNAL FETAL MEDICINE | Facility: CLINIC | Age: 39
End: 2019-08-29

## 2019-09-09 ENCOUNTER — OFFICE VISIT (OUTPATIENT)
Dept: MATERNAL FETAL MEDICINE | Facility: CLINIC | Age: 39
End: 2019-09-09
Attending: OBSTETRICS & GYNECOLOGY
Payer: COMMERCIAL

## 2019-09-09 ENCOUNTER — HOSPITAL ENCOUNTER (OUTPATIENT)
Dept: ULTRASOUND IMAGING | Facility: CLINIC | Age: 39
Discharge: HOME | End: 2019-09-09
Attending: OBSTETRICS & GYNECOLOGY | Admitting: OBSTETRICS & GYNECOLOGY
Payer: COMMERCIAL

## 2019-09-09 DIAGNOSIS — O26.90 PREGNANCY RELATED CONDITION, ANTEPARTUM: ICD-10-CM

## 2019-09-09 DIAGNOSIS — O43.192 MARGINAL INSERTION OF UMBILICAL CORD AFFECTING MANAGEMENT OF MOTHER IN SECOND TRIMESTER: ICD-10-CM

## 2019-09-09 DIAGNOSIS — O44.42 LOW LYING PLACENTA NOS OR WITHOUT HEMORRHAGE, SECOND TRIMESTER: ICD-10-CM

## 2019-09-09 DIAGNOSIS — O09.529 AMA (ADVANCED MATERNAL AGE) MULTIGRAVIDA 35+, UNSPECIFIED TRIMESTER: Primary | ICD-10-CM

## 2019-09-09 PROCEDURE — 40000072 ZZH STATISTIC GENETIC COUNSELING, < 16 MIN: Mod: ZF | Performed by: GENETIC COUNSELOR, MS

## 2019-09-09 PROCEDURE — 76811 OB US DETAILED SNGL FETUS: CPT

## 2019-09-09 NOTE — PROGRESS NOTES
Please see ultrasound report under imaging tab for details on ultrasound performed today.    Kaye Nagel MD  , OB/GYN  Maternal-Fetal Medicine  [email or web address]  [phone] (Academic office)  [phone] (Pager)

## 2019-09-09 NOTE — PROGRESS NOTES
[facility]  Genetic Counseling Consult    Patient:  Nicky Dorman YOB: 1980   Date of Service:  19      Nicky Dorman was seen at the [facility] for genetic consultation as part of her appointment for comprehensive ultrasound.  The indication for genetic counseling is advanced maternal age.       Impression/Plan:   1. Nicky had a cell-free fetal DNA test earlier in pregnancy, which was normal.    2. Nicky had a comprehensive (level II) ultrasound today.  Please see the ultrasound report for further details.    3. The patient declines genetic amniocentesis.    Pregnancy History:   /Parity:    Age at Delivery: 39 year old  THIAGO: 2020, by Last Menstrual Period  Gestational Age: 18w4d    No significant complications or exposures were reported in the current pregnancy.    Pregnancy history:  o SAB  o 2016: Term, , female  o 2018: Term, , male         Family History:   A three-generation pedigree was obtained, and is scanned under the  Media  tab.   The reported family history is negative for multiple miscarriages, stillbirths, birth defects, mental retardation, known genetic conditions, and consanguinity.       Carrier Screening:   The patient reports that she and the father of the pregnancy have  ancestry:      Cystic fibrosis is an autosomal recessive genetic condition that occurs with increased frequency in individuals of  ancestry and carrier screening for this condition is available.  In addition,  screening in the [facility] includes cystic fibrosis.        Expanded carrier screening for mutations in a large panel of genes associated with autosomal recessive conditions including cystic fibrosis, spinal muscular atrophy, and others, is now available.      The patient has had previous carrier screening, the results of which were reportedly  negative.  A copy of the report was not available for our review today.       Risk Assessment for Chromosome Conditions:   We explained that the risk for fetal chromosome abnormalities increases with maternal age. We discussed specific features of common chromosome abnormalities, including Down syndrome, trisomy 13, trisomy 18, and sex chromosome trisomies.      At age 39 at delivery, the midtrimester risk to have a baby with Down syndrome is 1 in 98.     At age 39 at delivery, the midtrimester risk to have a baby with any chromosome abnormality is 1 in 51.     Nicky had aneuploidy screening earlier in pregnancy.   Non-invasive Prenatal Testing (NIPT)    Maternal plasma cell-free DNA testing    Screens for fetal trisomy 21, trisomy 13, trisomy 18, and sex chromosome aneuploidy    Nicky had an InformaSeq test earlier in pregnancy; we reviewed the results today, which are normal for chromosome 13, chromosome 18 and chromosome 21 (LOW RISK)    Given the accuracy of this test, these results greatly decrease the chance for certain fetal chromosome abnormalities    We discussed the limitations of normal NIPT results       Testing Options:   We discussed the following options:   Genetic Amniocentesis    Invasive procedure typically performed in the second trimester by which amniotic fluid is obtained for the purpose of chromosome analysis and/or other prenatal genetic analysis    Diagnostic results; >99% sensitivity for fetal chromosome abnormalities    AFAFP measurement tests for open neural tube defects     Comprehensive (Level II) ultrasound: Detailed ultrasound performed between 18-22 weeks gestation to screen for major birth defects and markers for aneuploidy.    We reviewed the benefits and limitations of this testing.  Screening tests provide a risk assessment specific to the pregnancy for certain fetal chromosome abnormalities, but cannot definitively diagnose or exclude a fetal chromosome abnormality.   Follow-up genetic counseling and consideration of diagnostic testing is recommended with any abnormal screening result. Diagnostic tests carry inherent risks- including risk of miscarriage- that require careful consideration.  These tests can detect fetal chromosome abnormalities with greater than 99% certainty.  Results can be compromised by maternal cell contamination or mosaicism, and are limited by the resolution of cytogenetic G-banding technology.  There is no screening nor diagnostic test that can detect all forms of birth defects or mental disability.    It was a pleasure to be involved with Nicky schneider care. Face-to-face time of the meeting was 15 minutes.    Mar Bermudez MS, [facility]  Maternal Fetal Medicine  [facility]  Phone:[phone]  Email: [email or web address]

## 2019-09-30 ENCOUNTER — HOSPITAL ENCOUNTER (OUTPATIENT)
Dept: ULTRASOUND IMAGING | Facility: CLINIC | Age: 39
Discharge: HOME | End: 2019-09-30
Attending: OBSTETRICS & GYNECOLOGY | Admitting: OBSTETRICS & GYNECOLOGY
Payer: COMMERCIAL

## 2019-09-30 ENCOUNTER — OFFICE VISIT (OUTPATIENT)
Dept: MATERNAL FETAL MEDICINE | Facility: CLINIC | Age: 39
End: 2019-09-30
Attending: OBSTETRICS & GYNECOLOGY
Payer: COMMERCIAL

## 2019-09-30 DIAGNOSIS — O43.192 MARGINAL INSERTION OF UMBILICAL CORD AFFECTING MANAGEMENT OF MOTHER IN SECOND TRIMESTER: ICD-10-CM

## 2019-09-30 DIAGNOSIS — O09.529 AMA (ADVANCED MATERNAL AGE) MULTIGRAVIDA 35+, UNSPECIFIED TRIMESTER: ICD-10-CM

## 2019-09-30 DIAGNOSIS — O09.529 AMA (ADVANCED MATERNAL AGE) MULTIGRAVIDA 35+, UNSPECIFIED TRIMESTER: Primary | ICD-10-CM

## 2019-09-30 PROCEDURE — 76816 OB US FOLLOW-UP PER FETUS: CPT

## 2019-12-26 ENCOUNTER — TELEPHONE (OUTPATIENT)
Dept: INFUSION THERAPY | Facility: CLINIC | Age: 39
End: 2019-12-26

## 2019-12-26 ENCOUNTER — TRANSFERRED RECORDS (OUTPATIENT)
Dept: HEALTH INFORMATION MANAGEMENT | Facility: CLINIC | Age: 39
End: 2019-12-26

## 2019-12-26 NOTE — TELEPHONE ENCOUNTER
Left voicemail message for patient requesting a return call regarding scheduling infusion appointments. NEW ORDER   
room air

## 2019-12-27 DIAGNOSIS — D50.9 IRON DEFICIENCY ANEMIA, UNSPECIFIED: ICD-10-CM

## 2019-12-31 ENCOUNTER — INFUSION THERAPY VISIT (OUTPATIENT)
Dept: INFUSION THERAPY | Facility: CLINIC | Age: 39
End: 2019-12-31
Attending: OBSTETRICS & GYNECOLOGY
Payer: COMMERCIAL

## 2019-12-31 VITALS
TEMPERATURE: 98 F | DIASTOLIC BLOOD PRESSURE: 67 MMHG | RESPIRATION RATE: 16 BRPM | SYSTOLIC BLOOD PRESSURE: 111 MMHG | HEART RATE: 83 BPM

## 2019-12-31 DIAGNOSIS — D50.9 IRON DEFICIENCY ANEMIA, UNSPECIFIED: Primary | ICD-10-CM

## 2019-12-31 PROCEDURE — 25000128 H RX IP 250 OP 636: Performed by: OBSTETRICS & GYNECOLOGY

## 2019-12-31 PROCEDURE — 96365 THER/PROPH/DIAG IV INF INIT: CPT

## 2019-12-31 PROCEDURE — 25800030 ZZH RX IP 258 OP 636: Performed by: OBSTETRICS & GYNECOLOGY

## 2019-12-31 PROCEDURE — 96366 THER/PROPH/DIAG IV INF ADDON: CPT

## 2019-12-31 RX ADMIN — IRON SUCROSE 300 MG: 20 INJECTION, SOLUTION INTRAVENOUS at 14:40

## 2019-12-31 ASSESSMENT — PAIN SCALES - GENERAL: PAINLEVEL: NO PAIN (0)

## 2019-12-31 NOTE — PROGRESS NOTES
Infusion Nursing Note:  Nicky Dorman presents today for venofer.    Patient seen by provider today: No   present during visit today: Not Applicable.    Note: N/A.    Intravenous Access:  Peripheral IV placed.    Treatment Conditions:  Not Applicable.      Post Infusion Assessment:  Patient tolerated infusion without incident.  Blood return noted pre and post infusion.  Site patent and intact, free from redness, edema or discomfort.  No evidence of extravasations.  Access discontinued per protocol.       Discharge Plan:   Discharge instructions reviewed with: Patient.  Patient and/or family verbalized understanding of discharge instructions and all questions answered.  Patient discharged in stable condition accompanied by: self.  Departure Mode: Ambulatory.    Mary Hughes RN

## 2020-01-09 ENCOUNTER — INFUSION THERAPY VISIT (OUTPATIENT)
Dept: INFUSION THERAPY | Facility: CLINIC | Age: 40
End: 2020-01-09
Attending: OBSTETRICS & GYNECOLOGY
Payer: COMMERCIAL

## 2020-01-09 VITALS
SYSTOLIC BLOOD PRESSURE: 124 MMHG | TEMPERATURE: 97.6 F | HEART RATE: 79 BPM | DIASTOLIC BLOOD PRESSURE: 73 MMHG | RESPIRATION RATE: 18 BRPM | OXYGEN SATURATION: 99 %

## 2020-01-09 DIAGNOSIS — D50.9 IRON DEFICIENCY ANEMIA, UNSPECIFIED: Primary | ICD-10-CM

## 2020-01-09 PROCEDURE — 25000128 H RX IP 250 OP 636: Performed by: OBSTETRICS & GYNECOLOGY

## 2020-01-09 PROCEDURE — 96366 THER/PROPH/DIAG IV INF ADDON: CPT

## 2020-01-09 PROCEDURE — 96365 THER/PROPH/DIAG IV INF INIT: CPT

## 2020-01-09 PROCEDURE — 25800030 ZZH RX IP 258 OP 636: Performed by: OBSTETRICS & GYNECOLOGY

## 2020-01-09 RX ADMIN — SODIUM CHLORIDE 250 ML: 9 INJECTION, SOLUTION INTRAVENOUS at 14:42

## 2020-01-09 RX ADMIN — IRON SUCROSE 300 MG: 20 INJECTION, SOLUTION INTRAVENOUS at 14:52

## 2020-01-09 ASSESSMENT — PAIN SCALES - GENERAL: PAINLEVEL: NO PAIN (0)

## 2020-01-09 NOTE — PROGRESS NOTES
Infusion Nursing Note:  Nicky Dorman presents today for Venofer.    Patient seen by provider today: No    Note: Patient currently pregnant at 36 weeks.  Feels fatigue and some SOB.  Reports to tolerating her last Venofer infusion well.    Intravenous Access:  Peripheral IV placed.      Treatment Conditions:  Not Applicable.      Post Infusion Assessment:  Patient tolerated infusion without incident.  Blood return noted pre and post infusion.  Site patent and intact, free from redness, edema or discomfort.  No evidence of extravasations.  Access discontinued per protocol.    Discharge Plan:   Patient declined prescription refills.  Discharge instructions reviewed with: Patient.  Patient and/or family verbalized understanding of discharge instructions and all questions answered.  AVS to patient via [facility].  Patient has no future appointments scheduled for this clinic.  Patient discharged in stable condition accompanied by: self.  Departure Mode: Ambulatory.    Kirstin Comer, RN, RN

## 2020-01-19 ENCOUNTER — HOSPITAL ENCOUNTER (INPATIENT)
Facility: CLINIC | Age: 40
LOS: 3 days | Discharge: HOME | End: 2020-01-22
Attending: OBSTETRICS & GYNECOLOGY | Admitting: OBSTETRICS & GYNECOLOGY
Payer: COMMERCIAL

## 2020-01-19 ENCOUNTER — ANESTHESIA EVENT (OUTPATIENT)
Dept: OBGYN | Facility: CLINIC | Age: 40
End: 2020-01-19
Payer: COMMERCIAL

## 2020-01-19 ENCOUNTER — ANESTHESIA (OUTPATIENT)
Dept: OBGYN | Facility: CLINIC | Age: 40
End: 2020-01-19
Payer: COMMERCIAL

## 2020-01-19 DIAGNOSIS — Z98.891 S/P CESAREAN SECTION: Primary | ICD-10-CM

## 2020-01-19 PROBLEM — O26.90 PREGNANCY RELATED CONDITION: Status: ACTIVE | Noted: 2020-01-19

## 2020-01-19 LAB
ABO + RH BLD: NORMAL
ABO + RH BLD: NORMAL
BLD GP AB SCN SERPL QL: NORMAL
BLOOD BANK CMNT PATIENT-IMP: NORMAL
HGB BLD-MCNC: 12 G/DL (ref 11.7–15.7)
SPECIMEN EXP DATE BLD: NORMAL

## 2020-01-19 PROCEDURE — 71000013 ZZH RECOVERY PHASE 1 LEVEL 1 EA ADDTL HR: Performed by: OBSTETRICS & GYNECOLOGY

## 2020-01-19 PROCEDURE — 86780 TREPONEMA PALLIDUM: CPT | Performed by: OBSTETRICS & GYNECOLOGY

## 2020-01-19 PROCEDURE — 25000125 ZZHC RX 250: Performed by: NURSE ANESTHETIST, CERTIFIED REGISTERED

## 2020-01-19 PROCEDURE — 27210794 ZZH OR GENERAL SUPPLY STERILE: Performed by: OBSTETRICS & GYNECOLOGY

## 2020-01-19 PROCEDURE — 12000035 ZZH R&B POSTPARTUM

## 2020-01-19 PROCEDURE — 86850 RBC ANTIBODY SCREEN: CPT | Performed by: OBSTETRICS & GYNECOLOGY

## 2020-01-19 PROCEDURE — 85018 HEMOGLOBIN: CPT | Performed by: OBSTETRICS & GYNECOLOGY

## 2020-01-19 PROCEDURE — 36415 COLL VENOUS BLD VENIPUNCTURE: CPT | Performed by: OBSTETRICS & GYNECOLOGY

## 2020-01-19 PROCEDURE — 0HB7XZZ EXCISION OF ABDOMEN SKIN, EXTERNAL APPROACH: ICD-10-PCS | Performed by: OBSTETRICS & GYNECOLOGY

## 2020-01-19 PROCEDURE — 36000056 ZZH SURGERY LEVEL 3 1ST 30 MIN: Performed by: OBSTETRICS & GYNECOLOGY

## 2020-01-19 PROCEDURE — 25000125 ZZHC RX 250: Performed by: OBSTETRICS & GYNECOLOGY

## 2020-01-19 PROCEDURE — G0463 HOSPITAL OUTPT CLINIC VISIT: HCPCS

## 2020-01-19 PROCEDURE — 86900 BLOOD TYPING SEROLOGIC ABO: CPT | Performed by: OBSTETRICS & GYNECOLOGY

## 2020-01-19 PROCEDURE — 37000009 ZZH ANESTHESIA TECHNICAL FEE, EACH ADDTL 15 MIN: Performed by: OBSTETRICS & GYNECOLOGY

## 2020-01-19 PROCEDURE — 25800030 ZZH RX IP 258 OP 636: Performed by: OBSTETRICS & GYNECOLOGY

## 2020-01-19 PROCEDURE — 25000128 H RX IP 250 OP 636: Performed by: NURSE ANESTHETIST, CERTIFIED REGISTERED

## 2020-01-19 PROCEDURE — 25000132 ZZH RX MED GY IP 250 OP 250 PS 637

## 2020-01-19 PROCEDURE — 59025 FETAL NON-STRESS TEST: CPT

## 2020-01-19 PROCEDURE — 25000128 H RX IP 250 OP 636: Performed by: OBSTETRICS & GYNECOLOGY

## 2020-01-19 PROCEDURE — 86901 BLOOD TYPING SEROLOGIC RH(D): CPT | Performed by: OBSTETRICS & GYNECOLOGY

## 2020-01-19 PROCEDURE — 37000008 ZZH ANESTHESIA TECHNICAL FEE, 1ST 30 MIN: Performed by: OBSTETRICS & GYNECOLOGY

## 2020-01-19 PROCEDURE — 36000058 ZZH SURGERY LEVEL 3 EA 15 ADDTL MIN: Performed by: OBSTETRICS & GYNECOLOGY

## 2020-01-19 PROCEDURE — 71000012 ZZH RECOVERY PHASE 1 LEVEL 1 FIRST HR: Performed by: OBSTETRICS & GYNECOLOGY

## 2020-01-19 RX ORDER — HYDROCORTISONE 2.5 %
CREAM (GRAM) TOPICAL 3 TIMES DAILY PRN
Status: DISCONTINUED | OUTPATIENT
Start: 2020-01-19 | End: 2020-01-22 | Stop reason: HOSPADM

## 2020-01-19 RX ORDER — ONDANSETRON 2 MG/ML
INJECTION INTRAMUSCULAR; INTRAVENOUS
Status: DISCONTINUED
Start: 2020-01-19 | End: 2020-01-19 | Stop reason: HOSPADM

## 2020-01-19 RX ORDER — FENTANYL CITRATE 50 UG/ML
25-50 INJECTION, SOLUTION INTRAMUSCULAR; INTRAVENOUS
Status: DISCONTINUED | OUTPATIENT
Start: 2020-01-19 | End: 2020-01-19 | Stop reason: HOSPADM

## 2020-01-19 RX ORDER — ACETAMINOPHEN 325 MG/1
975 TABLET ORAL EVERY 8 HOURS
Status: DISCONTINUED | OUTPATIENT
Start: 2020-01-19 | End: 2020-01-22 | Stop reason: HOSPADM

## 2020-01-19 RX ORDER — ONDANSETRON 4 MG/1
4 TABLET, ORALLY DISINTEGRATING ORAL EVERY 30 MIN PRN
Status: DISCONTINUED | OUTPATIENT
Start: 2020-01-19 | End: 2020-01-19 | Stop reason: HOSPADM

## 2020-01-19 RX ORDER — AMOXICILLIN 250 MG
2 CAPSULE ORAL 2 TIMES DAILY PRN
Status: DISCONTINUED | OUTPATIENT
Start: 2020-01-19 | End: 2020-01-22 | Stop reason: HOSPADM

## 2020-01-19 RX ORDER — DEXTROSE, SODIUM CHLORIDE, SODIUM LACTATE, POTASSIUM CHLORIDE, AND CALCIUM CHLORIDE 5; .6; .31; .03; .02 G/100ML; G/100ML; G/100ML; G/100ML; G/100ML
INJECTION, SOLUTION INTRAVENOUS CONTINUOUS
Status: DISCONTINUED | OUTPATIENT
Start: 2020-01-19 | End: 2020-01-22 | Stop reason: HOSPADM

## 2020-01-19 RX ORDER — ALBUTEROL SULFATE 0.83 MG/ML
2.5 SOLUTION RESPIRATORY (INHALATION) EVERY 4 HOURS PRN
Status: DISCONTINUED | OUTPATIENT
Start: 2020-01-19 | End: 2020-01-19 | Stop reason: HOSPADM

## 2020-01-19 RX ORDER — ACETAMINOPHEN 325 MG/1
650 TABLET ORAL EVERY 4 HOURS PRN
Status: DISCONTINUED | OUTPATIENT
Start: 2020-01-22 | End: 2020-01-22 | Stop reason: HOSPADM

## 2020-01-19 RX ORDER — ONDANSETRON 4 MG/1
4 TABLET, ORALLY DISINTEGRATING ORAL EVERY 6 HOURS PRN
Status: DISCONTINUED | OUTPATIENT
Start: 2020-01-19 | End: 2020-01-22 | Stop reason: CLARIF

## 2020-01-19 RX ORDER — KETOROLAC TROMETHAMINE 30 MG/ML
INJECTION, SOLUTION INTRAMUSCULAR; INTRAVENOUS
Status: DISCONTINUED
Start: 2020-01-19 | End: 2020-01-19 | Stop reason: HOSPADM

## 2020-01-19 RX ORDER — IBUPROFEN 400 MG/1
800 TABLET, FILM COATED ORAL EVERY 6 HOURS PRN
Status: DISCONTINUED | OUTPATIENT
Start: 2020-01-19 | End: 2020-01-22 | Stop reason: HOSPADM

## 2020-01-19 RX ORDER — BISACODYL 10 MG
10 SUPPOSITORY, RECTAL RECTAL DAILY PRN
Status: DISCONTINUED | OUTPATIENT
Start: 2020-01-21 | End: 2020-01-22 | Stop reason: HOSPADM

## 2020-01-19 RX ORDER — NALOXONE HYDROCHLORIDE 0.4 MG/ML
.1-.4 INJECTION, SOLUTION INTRAMUSCULAR; INTRAVENOUS; SUBCUTANEOUS
Status: DISCONTINUED | OUTPATIENT
Start: 2020-01-19 | End: 2020-01-22 | Stop reason: HOSPADM

## 2020-01-19 RX ORDER — ACETAMINOPHEN 325 MG/1
TABLET ORAL
Status: COMPLETED
Start: 2020-01-19 | End: 2020-01-19

## 2020-01-19 RX ORDER — CITRIC ACID/SODIUM CITRATE 334-500MG
30 SOLUTION, ORAL ORAL
Status: COMPLETED | OUTPATIENT
Start: 2020-01-19 | End: 2020-01-19

## 2020-01-19 RX ORDER — BUPIVACAINE HYDROCHLORIDE 7.5 MG/ML
INJECTION, SOLUTION INTRASPINAL PRN
Status: DISCONTINUED | OUTPATIENT
Start: 2020-01-19 | End: 2020-01-19

## 2020-01-19 RX ORDER — GENTAMICIN SULFATE 60 MG/50ML
1.5 INJECTION, SOLUTION INTRAVENOUS
Status: COMPLETED | OUTPATIENT
Start: 2020-01-19 | End: 2020-01-19

## 2020-01-19 RX ORDER — OXYTOCIN 10 [USP'U]/ML
10 INJECTION, SOLUTION INTRAMUSCULAR; INTRAVENOUS
Status: DISCONTINUED | OUTPATIENT
Start: 2020-01-19 | End: 2020-01-22 | Stop reason: HOSPADM

## 2020-01-19 RX ORDER — NALOXONE HYDROCHLORIDE 0.4 MG/ML
.1-.4 INJECTION, SOLUTION INTRAMUSCULAR; INTRAVENOUS; SUBCUTANEOUS
Status: ACTIVE | OUTPATIENT
Start: 2020-01-19 | End: 2020-01-20

## 2020-01-19 RX ORDER — ONDANSETRON 2 MG/ML
4 INJECTION INTRAMUSCULAR; INTRAVENOUS EVERY 6 HOURS PRN
Status: DISCONTINUED | OUTPATIENT
Start: 2020-01-19 | End: 2020-01-22 | Stop reason: CLARIF

## 2020-01-19 RX ORDER — OXYCODONE HYDROCHLORIDE 5 MG/1
5-10 TABLET ORAL
Status: DISCONTINUED | OUTPATIENT
Start: 2020-01-19 | End: 2020-01-22 | Stop reason: HOSPADM

## 2020-01-19 RX ORDER — ONDANSETRON 2 MG/ML
4 INJECTION INTRAMUSCULAR; INTRAVENOUS EVERY 6 HOURS PRN
Status: DISCONTINUED | OUTPATIENT
Start: 2020-01-19 | End: 2020-01-22 | Stop reason: HOSPADM

## 2020-01-19 RX ORDER — OXYTOCIN/0.9 % SODIUM CHLORIDE 30/500 ML
PLASTIC BAG, INJECTION (ML) INTRAVENOUS CONTINUOUS PRN
Status: DISCONTINUED | OUTPATIENT
Start: 2020-01-19 | End: 2020-01-19

## 2020-01-19 RX ORDER — MEPERIDINE HYDROCHLORIDE 25 MG/ML
12.5 INJECTION INTRAMUSCULAR; INTRAVENOUS; SUBCUTANEOUS EVERY 5 MIN PRN
Status: DISCONTINUED | OUTPATIENT
Start: 2020-01-19 | End: 2020-01-19 | Stop reason: HOSPADM

## 2020-01-19 RX ORDER — HYDROMORPHONE HYDROCHLORIDE 1 MG/ML
.3-.5 INJECTION, SOLUTION INTRAMUSCULAR; INTRAVENOUS; SUBCUTANEOUS EVERY 30 MIN PRN
Status: DISCONTINUED | OUTPATIENT
Start: 2020-01-19 | End: 2020-01-22 | Stop reason: HOSPADM

## 2020-01-19 RX ORDER — OXYTOCIN/0.9 % SODIUM CHLORIDE 30/500 ML
340 PLASTIC BAG, INJECTION (ML) INTRAVENOUS CONTINUOUS PRN
Status: DISCONTINUED | OUTPATIENT
Start: 2020-01-19 | End: 2020-01-22 | Stop reason: HOSPADM

## 2020-01-19 RX ORDER — LIDOCAINE 40 MG/G
CREAM TOPICAL
Status: DISCONTINUED | OUTPATIENT
Start: 2020-01-19 | End: 2020-01-22 | Stop reason: CLARIF

## 2020-01-19 RX ORDER — CLINDAMYCIN PHOSPHATE 900 MG/50ML
900 INJECTION, SOLUTION INTRAVENOUS
Status: COMPLETED | OUTPATIENT
Start: 2020-01-19 | End: 2020-01-19

## 2020-01-19 RX ORDER — KETOROLAC TROMETHAMINE 30 MG/ML
30 INJECTION, SOLUTION INTRAMUSCULAR; INTRAVENOUS EVERY 6 HOURS
Status: DISPENSED | OUTPATIENT
Start: 2020-01-19 | End: 2020-01-21

## 2020-01-19 RX ORDER — OXYTOCIN/0.9 % SODIUM CHLORIDE 30/500 ML
100 PLASTIC BAG, INJECTION (ML) INTRAVENOUS CONTINUOUS
Status: DISCONTINUED | OUTPATIENT
Start: 2020-01-19 | End: 2020-01-22 | Stop reason: HOSPADM

## 2020-01-19 RX ORDER — NALBUPHINE HYDROCHLORIDE 10 MG/ML
2.5-5 INJECTION, SOLUTION INTRAMUSCULAR; INTRAVENOUS; SUBCUTANEOUS EVERY 6 HOURS PRN
Status: DISCONTINUED | OUTPATIENT
Start: 2020-01-19 | End: 2020-01-22 | Stop reason: CLARIF

## 2020-01-19 RX ORDER — MORPHINE SULFATE 1 MG/ML
INJECTION, SOLUTION EPIDURAL; INTRATHECAL; INTRAVENOUS PRN
Status: DISCONTINUED | OUTPATIENT
Start: 2020-01-19 | End: 2020-01-19

## 2020-01-19 RX ORDER — NALOXONE HYDROCHLORIDE 0.4 MG/ML
.1-.4 INJECTION, SOLUTION INTRAMUSCULAR; INTRAVENOUS; SUBCUTANEOUS
Status: DISCONTINUED | OUTPATIENT
Start: 2020-01-19 | End: 2020-01-22 | Stop reason: CLARIF

## 2020-01-19 RX ORDER — POLYETHYLENE GLYCOL 3350 17 G/17G
17 POWDER, FOR SOLUTION ORAL DAILY PRN
Status: DISCONTINUED | OUTPATIENT
Start: 2020-01-19 | End: 2020-01-22 | Stop reason: HOSPADM

## 2020-01-19 RX ORDER — SIMETHICONE 80 MG
80 TABLET,CHEWABLE ORAL 4 TIMES DAILY PRN
Status: DISCONTINUED | OUTPATIENT
Start: 2020-01-19 | End: 2020-01-22 | Stop reason: HOSPADM

## 2020-01-19 RX ORDER — LIDOCAINE 40 MG/G
CREAM TOPICAL
Status: DISCONTINUED | OUTPATIENT
Start: 2020-01-19 | End: 2020-01-22 | Stop reason: HOSPADM

## 2020-01-19 RX ORDER — CITRIC ACID/SODIUM CITRATE 334-500MG
SOLUTION, ORAL ORAL
Status: COMPLETED
Start: 2020-01-19 | End: 2020-01-19

## 2020-01-19 RX ORDER — NITROFURANTOIN 25; 75 MG/1; MG/1
100 CAPSULE ORAL 2 TIMES DAILY
Status: ON HOLD | COMMUNITY
End: 2020-01-21

## 2020-01-19 RX ORDER — HYDRALAZINE HYDROCHLORIDE 20 MG/ML
2.5-5 INJECTION INTRAMUSCULAR; INTRAVENOUS EVERY 10 MIN PRN
Status: DISCONTINUED | OUTPATIENT
Start: 2020-01-19 | End: 2020-01-19 | Stop reason: HOSPADM

## 2020-01-19 RX ORDER — PHENYLEPHRINE HCL IN 0.9% NACL 1 MG/10 ML
SYRINGE (ML) INTRAVENOUS CONTINUOUS PRN
Status: DISCONTINUED | OUTPATIENT
Start: 2020-01-19 | End: 2020-01-19

## 2020-01-19 RX ORDER — OXYTOCIN/0.9 % SODIUM CHLORIDE 30/500 ML
PLASTIC BAG, INJECTION (ML) INTRAVENOUS
Status: DISCONTINUED
Start: 2020-01-19 | End: 2020-01-19 | Stop reason: HOSPADM

## 2020-01-19 RX ORDER — SODIUM CHLORIDE, SODIUM LACTATE, POTASSIUM CHLORIDE, CALCIUM CHLORIDE 600; 310; 30; 20 MG/100ML; MG/100ML; MG/100ML; MG/100ML
INJECTION, SOLUTION INTRAVENOUS CONTINUOUS
Status: DISCONTINUED | OUTPATIENT
Start: 2020-01-19 | End: 2020-01-19 | Stop reason: HOSPADM

## 2020-01-19 RX ORDER — HYDROMORPHONE HYDROCHLORIDE 1 MG/ML
.3-.5 INJECTION, SOLUTION INTRAMUSCULAR; INTRAVENOUS; SUBCUTANEOUS EVERY 5 MIN PRN
Status: DISCONTINUED | OUTPATIENT
Start: 2020-01-19 | End: 2020-01-19 | Stop reason: HOSPADM

## 2020-01-19 RX ORDER — CLINDAMYCIN PHOSPHATE 900 MG/50ML
INJECTION, SOLUTION INTRAVENOUS
Status: DISCONTINUED
Start: 2020-01-19 | End: 2020-01-19 | Stop reason: HOSPADM

## 2020-01-19 RX ORDER — LANOLIN 100 %
OINTMENT (GRAM) TOPICAL
Status: DISCONTINUED | OUTPATIENT
Start: 2020-01-19 | End: 2020-01-22 | Stop reason: HOSPADM

## 2020-01-19 RX ORDER — ONDANSETRON 2 MG/ML
4 INJECTION INTRAMUSCULAR; INTRAVENOUS EVERY 30 MIN PRN
Status: DISCONTINUED | OUTPATIENT
Start: 2020-01-19 | End: 2020-01-19 | Stop reason: HOSPADM

## 2020-01-19 RX ORDER — AMOXICILLIN 250 MG
1 CAPSULE ORAL 2 TIMES DAILY PRN
Status: DISCONTINUED | OUTPATIENT
Start: 2020-01-19 | End: 2020-01-22 | Stop reason: HOSPADM

## 2020-01-19 RX ORDER — ONDANSETRON 2 MG/ML
INJECTION INTRAMUSCULAR; INTRAVENOUS PRN
Status: DISCONTINUED | OUTPATIENT
Start: 2020-01-19 | End: 2020-01-19

## 2020-01-19 RX ORDER — KETOROLAC TROMETHAMINE 30 MG/ML
INJECTION, SOLUTION INTRAMUSCULAR; INTRAVENOUS PRN
Status: DISCONTINUED | OUTPATIENT
Start: 2020-01-19 | End: 2020-01-19

## 2020-01-19 RX ORDER — MORPHINE SULFATE 1 MG/ML
INJECTION, SOLUTION EPIDURAL; INTRATHECAL; INTRAVENOUS
Status: COMPLETED
Start: 2020-01-19 | End: 2020-01-19

## 2020-01-19 RX ADMIN — Medication 0.5 MCG/MIN: at 20:45

## 2020-01-19 RX ADMIN — ONDANSETRON 4 MG: 2 INJECTION INTRAMUSCULAR; INTRAVENOUS at 20:56

## 2020-01-19 RX ADMIN — CLINDAMYCIN PHOSPHATE 900 MG: 900 INJECTION, SOLUTION INTRAVENOUS at 20:37

## 2020-01-19 RX ADMIN — SODIUM CHLORIDE, POTASSIUM CHLORIDE, SODIUM LACTATE AND CALCIUM CHLORIDE: 600; 310; 30; 20 INJECTION, SOLUTION INTRAVENOUS at 20:37

## 2020-01-19 RX ADMIN — Medication 30 ML: at 20:17

## 2020-01-19 RX ADMIN — ACETAMINOPHEN 975 MG: 325 TABLET, FILM COATED ORAL at 22:48

## 2020-01-19 RX ADMIN — Medication 350 ML/HR: at 21:02

## 2020-01-19 RX ADMIN — SODIUM CHLORIDE, POTASSIUM CHLORIDE, SODIUM LACTATE AND CALCIUM CHLORIDE 1000 ML: 600; 310; 30; 20 INJECTION, SOLUTION INTRAVENOUS at 20:15

## 2020-01-19 RX ADMIN — GENTAMICIN SULFATE 120 MG: 60 INJECTION, SOLUTION INTRAVENOUS at 20:53

## 2020-01-19 RX ADMIN — BUPIVACAINE HYDROCHLORIDE IN DEXTROSE 10.5 MG: 7.5 INJECTION, SOLUTION SUBARACHNOID at 20:47

## 2020-01-19 RX ADMIN — MORPHINE SULFATE 0.2 MG: 1 INJECTION, SOLUTION EPIDURAL; INTRATHECAL; INTRAVENOUS at 20:47

## 2020-01-19 RX ADMIN — SODIUM CITRATE AND CITRIC ACID MONOHYDRATE 30 ML: 500; 334 SOLUTION ORAL at 20:17

## 2020-01-19 RX ADMIN — KETOROLAC TROMETHAMINE 30 MG: 30 INJECTION, SOLUTION INTRAMUSCULAR at 21:44

## 2020-01-19 RX ADMIN — SODIUM CHLORIDE, POTASSIUM CHLORIDE, SODIUM LACTATE AND CALCIUM CHLORIDE: 600; 310; 30; 20 INJECTION, SOLUTION INTRAVENOUS at 20:56

## 2020-01-19 ASSESSMENT — MIFFLIN-ST. JEOR: SCORE: 1617.08

## 2020-01-20 LAB
HGB BLD-MCNC: 10.5 G/DL (ref 11.7–15.7)
T PALLIDUM AB SER QL: NONREACTIVE

## 2020-01-20 PROCEDURE — 85018 HEMOGLOBIN: CPT | Performed by: OBSTETRICS & GYNECOLOGY

## 2020-01-20 PROCEDURE — 25000128 H RX IP 250 OP 636: Performed by: OBSTETRICS & GYNECOLOGY

## 2020-01-20 PROCEDURE — 36415 COLL VENOUS BLD VENIPUNCTURE: CPT | Performed by: OBSTETRICS & GYNECOLOGY

## 2020-01-20 PROCEDURE — 25000125 ZZHC RX 250: Performed by: OBSTETRICS & GYNECOLOGY

## 2020-01-20 PROCEDURE — 12000035 ZZH R&B POSTPARTUM

## 2020-01-20 PROCEDURE — 25000132 ZZH RX MED GY IP 250 OP 250 PS 637: Performed by: OBSTETRICS & GYNECOLOGY

## 2020-01-20 RX ADMIN — KETOROLAC TROMETHAMINE 30 MG: 30 INJECTION, SOLUTION INTRAMUSCULAR at 10:16

## 2020-01-20 RX ADMIN — ACETAMINOPHEN 975 MG: 325 TABLET, FILM COATED ORAL at 14:30

## 2020-01-20 RX ADMIN — SIMETHICONE CHEW TAB 80 MG 80 MG: 80 TABLET ORAL at 14:30

## 2020-01-20 RX ADMIN — IBUPROFEN 800 MG: 400 TABLET ORAL at 23:38

## 2020-01-20 RX ADMIN — SENNOSIDES AND DOCUSATE SODIUM 1 TABLET: 8.6; 5 TABLET ORAL at 07:56

## 2020-01-20 RX ADMIN — SODIUM CHLORIDE, SODIUM LACTATE, POTASSIUM CHLORIDE, CALCIUM CHLORIDE AND DEXTROSE MONOHYDRATE: 5; 600; 310; 30; 20 INJECTION, SOLUTION INTRAVENOUS at 05:57

## 2020-01-20 RX ADMIN — ACETAMINOPHEN 975 MG: 325 TABLET, FILM COATED ORAL at 23:38

## 2020-01-20 RX ADMIN — KETOROLAC TROMETHAMINE 30 MG: 30 INJECTION, SOLUTION INTRAMUSCULAR at 03:56

## 2020-01-20 RX ADMIN — KETOROLAC TROMETHAMINE 30 MG: 30 INJECTION, SOLUTION INTRAMUSCULAR at 16:04

## 2020-01-20 RX ADMIN — OXYCODONE HYDROCHLORIDE 5 MG: 5 TABLET ORAL at 00:53

## 2020-01-20 RX ADMIN — ACETAMINOPHEN 975 MG: 325 TABLET, FILM COATED ORAL at 06:39

## 2020-01-20 RX ADMIN — OXYCODONE HYDROCHLORIDE 5 MG: 5 TABLET ORAL at 04:42

## 2020-01-20 RX ADMIN — Medication 100 ML/HR: at 00:54

## 2020-01-20 RX ADMIN — OXYCODONE HYDROCHLORIDE 5 MG: 5 TABLET ORAL at 07:56

## 2020-01-20 NOTE — ANESTHESIA CARE TRANSFER NOTE
Patient: Nicky Dorman    Procedure(s):   SECTION    Diagnosis: PREVIOUS  SECTION  Diagnosis Additional Information: No value filed.    Anesthesia Type:   Spinal     Note:  Airway :Room Air  Patient transferred to:Labor and Delivery  Comments:   Transferred to PACU RN. Patient awake and verbal. Spontaneous resp and on room air. Monitors and alarms on. VSS. Report given.      Vitals: (Last set prior to Anesthesia Care Transfer)    CRNA VITALS  2020 2116 - 20206      2020             Resp Rate (set):  10                Electronically Signed By: ANIBAL Lee CRNA  2020  9:56 PM

## 2020-01-20 NOTE — H&P
H&P Update  40 yo  at 37 3/7 weeks with THIAGO 2020  Prenatal care complicated by   1. AMA  2. Marginal cord insertion  3. 2 previous  C/s  Presented in active labor with cervix dilated to 7cm on arrival  FHT's category 1.   Planned c/s for 2020  Recommend proceeding with c/s for active labor. Risks, benefits and possible complications discussed and consent signed

## 2020-01-20 NOTE — PROGRESS NOTES
Ignacio catheter removed at 0600.  Ambulated to the bathroom with SBA. No c/o nausea, lightheadedness or dizziness.

## 2020-01-20 NOTE — PLAN OF CARE
CRISTINE at 2000- pt found to be 7cm, Dr. Alex notified via phone at 2003, pre-op orders entered  Dr. Alex at bedside at 2030

## 2020-01-20 NOTE — PLAN OF CARE
Admitted from L&D via bed.  Arrived with baby, was accompanied by  and arrived with personal belongings. Report was taken from Saray in L&D.  VSS. Fundus is firm and midline.  Vaginal bleeding is light.  Complains of 2/10 pain. Lungs clear and bowel sounds active. Ignacio patent and draining.  Dressing to lower abdomen clean, dry and intact.  Pneumoboots in place to bilateral lower extremities.  Pt. And her  oriented to the room and call light system.

## 2020-01-20 NOTE — ANESTHESIA POSTPROCEDURE EVALUATION
Patient: Nicky Ponce Root    Procedure(s):   SECTION    Diagnosis:PREVIOUS  SECTION  Diagnosis Additional Information: No value filed.    Anesthesia Type:  Spinal    Note:  Anesthesia Post Evaluation    Patient location during evaluation: PACU  Patient participation: Able to fully participate in evaluation  Level of consciousness: awake  Pain management: adequate  Airway patency: patent  Cardiovascular status: acceptable  Respiratory status: acceptable  Hydration status: acceptable  PONV: none     Anesthetic complications: None          Last vitals:  Vitals:    20 0245 20 0345 20 0445   BP: 109/63 106/58 106/62   Pulse: 50 52 50   Resp: 16 16 16   Temp: 36.4  C (97.6  F)     SpO2: 100% 100% 100%         Electronically Signed By: Elizabeth Coffey MD, MD  2020  6:13 AM

## 2020-01-20 NOTE — PLAN OF CARE
Multip admitted to [facility], ambulatory per services of Dr. Alex for evaluation of labor.  Pt states she has been having irregular contractions over the last couple of days, becoming increasingly painful and more regular since 1530 today.  Denies LOF or bloody show, reports good fetal movement.  Discussed plan of care including EFM with NST, routine VS and SVE.  Pt agreeable.  EFM applied and admission assessment completed.

## 2020-01-20 NOTE — LACTATION NOTE
"Initial visit with Mendez Saunders and baby boy.    Breastfeeding general information reviewed.   Advised to breastfeed exclusively, on demand, avoid pacifiers, bottles and formula unless medically indicated.  Encouraged rooming in, skin to skin, feeding on demand 8-12x/day or sooner if baby cues.  Explained benefits of holding and skin to skin.  Encouraged lots of skin to skin. Instructed on hand expression.   Continues to nurse well per mom.  Plan is to breast feed as much as possible then will bottle with formula and declines pumping.  Nicky states she has a 16 month old.  And that she has attempted to breast feed her other two and had pump/taken OTC supplements, etc.  \"Never had a great milk supply\".   No further questions at this time.   Will follow as needed.   Trini Son BSN, RN, PHN, RNC-MNN, IBCLC    "

## 2020-01-20 NOTE — PLAN OF CARE
Fundus firm and bleeding wnl.  VSS.  Incision covered with a dressing and is clean, dry and intact.  Rates pain 2/10 and taking scheduled tylenol and toradol, oxycodone PRN with good relief.  Bedresting.  Encouraged to call with questions or concerns.  Will continue to monitor.

## 2020-01-20 NOTE — ANESTHESIA PROCEDURE NOTES
Peripheral nerve/Neuraxial procedure note : intrathecal  Pre-Procedure  Performed by Elizabeth Coffey MD  Location: OR      Pre-Anesthestic Checklist: patient identified, IV checked, risks and benefits discussed, informed consent, monitors and equipment checked, pre-op evaluation and post-op pain management    Timeout  Correct Patient: Yes   Correct Procedure: Yes   Correct Site: Yes   Correct Laterality: N/A   Correct Position: Yes   Site Marked: N/A   .   Procedure Documentation  ASA 2 and Emergent  .    Procedure: intrathecal, .   Patient Position:sitting Insertion Site:L4-5  (midline approach)     Patient Prep/Sterile Barriers; mask, sterile gloves, povidone-iodine 7.5% surgical scrub, patient draped.  .  Needle:  Spinal Needle (gauge): 25  Spinal/LP Needle Length (inches): 3.5 # of attempts: 1 and # of redirects:  Introducer used Introducer: 20 G .        Assessment/Narrative  Paresthesias: No.  .  .  clear CSF fluid removed .

## 2020-01-20 NOTE — OP NOTE
Procedure Date: 2020      PREOPERATIVE DIAGNOSES:   1.  Intrauterine pregnancy at 37 and 3/7 weeks.   2.  Previous  section x 2.   3.  Active labor.   4.  Marginal cord insertion.   5.  Advanced maternal age.      POSTOPERATIVE DIAGNOSES:   1.  Intrauterine pregnancy at 37 and 3/7 weeks.   2.  Previous  section x 2.   3.  Active labor.   4.  Marginal cord insertion.   5.  Advanced maternal age.      PROCEDURE:  Repeat low transverse  section with 2-layer closure.      SURGEON:  Ann Alex MD      ASSISTANT:  OR staff.      ANESTHESIA:  Spinal.      DRAINS:  Ignacio.      COMPLICATIONS:  None.      ESTIMATED BLOOD LOSS:  545 mL.      SPECIMENS REMOVED:  Placenta was discarded, and cord blood was sent to lab.      FINDINGS:  There was a thick keloid scar of the skin with some adherence to the fascia.  This was excised.  The tubes, uterus and ovaries appeared normal, and there was no intra-abdominal scar tissue noted.  The infant was a 6 pound 8 ounce male with Apgars of 8 at 1 minute, 9 at 5 minutes.  The fluid was noted to be clear, and there was a nuchal cord x 1 which was reduced.      INDICATIONS FOR PROCEDURE:  Nicky is a 39-year-old G4, P2-0-1-2 at 3/7 weeks gestational age.  Her pregnancy is complicated by a history of 2 previous  sections.  Her first one was done for a breech presentation, and her second was a repeat  section.  Pregnancy was also complicated by advanced maternal age, and she had a normal level 2 ultrasound and a normal NIPT and AFP.  She was found to have a marginal cord insertion and was followed with serial growth ultrasounds and biophysical profiles, all of which were reassuring.  She presented to Labor and Delivery today with onset of active labor.  She was 7 cm on arrival.  Given her 2 previous  sections, I recommended proceeding with repeat  section.  The risks, benefits and possible complications of the procedure were  discussed with the patient, and consent was signed.      DESCRIPTION OF TECHNIQUE:  The patient was brought to the operating room.  Spinal anesthetic was induced without complication.  The patient was placed in the left lateral tilt supine position and prepped and draped in the usual sterile fashion.  Ignacio catheter was placed.  The surgical timeout was then performed, identifying the correct patient and procedure.  An adequate level of anesthesia was assured with a pinch test.  A Pfannenstiel incision was made in elliptical fashion, and the old skin incision was excised.  Dissection was carried out to the level of fascia sharply.  The fascia was incised in the midline and the incision extended laterally sharply.  The fascia was then  off the underlying rectus muscles both bluntly and sharply.  The rectus muscles were  in the midline, and the peritoneum was elevated and entered bluntly without complication.  The incision was then stretched.  The bladder blade was then placed, and the bladder reflection was noted to be low on the uterus, and therefore no bladder flap was created.  A transverse hysterotomy was made in the lower uterine segment, and the incision was extended laterally bluntly.  The head was elevated and delivered and without complication.  The nuchal cord was reduced.  The remainder of the baby was delivered without complication.  Delayed cord clamping was performed for 30 seconds.  The cord was then clamped and cut, and the infant was handed to waiting Pediatrics nurse.  A segment of cord was obtained for gases, but these were not sent.  Cord blood was obtained.  The fundus of the uterus was massaged and the placenta removed.  The uterus was exteriorized and cleaned of membrane and clot.  Pitocin was administered intravenously.  Hysterotomy was then closed in running locked fashion with 0 Vicryl.  A second running imbricating layer of 0 Vicryl.  There was a small amount of bleeding  on the right lateral aspect of the incision, and hemostasis was assured with additional figure-of-eight sutures of 0 Vicryl.  Adequate hemostasis was then assured.  The cul-de-sac was irrigated and suctioned.  The uterus was replaced into the peritoneal cavity, and the pericolic gutters were irrigated and suctioned and adequate hemostasis was assured.  The peritoneum was then closed in running fashion with 3-0 chromic.  The rectus muscles were reapproximated in the midline with 0 Vicryl in interrupted fashion.  The fascia was closed in running fashion with 0 PDS.  The subcutaneous tissue was irrigated and suctioned and closed in running fashion with 3-0 Vicryl.  Skin was closed with staples.  Steri-Strips and a sterile dressing were applied.  The patient tolerated the procedure well and was brought to the recovery room in stable condition.  All final counts were correct.         CELINA TOPETE MD             D: 2020   T: 2020   MT: RITCHIE      Name:     JAYLA DE LA GARZA   MRN:      [id number]        Account:        [id number]   :      1980           Procedure Date: 2020      Document: [id number]

## 2020-01-20 NOTE — PLAN OF CARE
Vital signs stable. Incision dressing intact. Voiding without difficulty. Lung sounds clear and equal. Using tylenol/ibuprofen for pain management. Up ambulating free of dizziness. Working on breastfeeding every 2-3 hours. Questions/concerns addressed.

## 2020-01-20 NOTE — PLAN OF CARE
Data: Nicky Dorman transferred to 402 via bed at 2340 Baby transferred via parent's arms.  Action: Receiving unit notified of transfer: Yes. Patient and family notified of room change. Report given to Nya ROGERS Rn at 2345. Belongings sent to receiving unit. Accompanied by Registered Nurse. Oriented patient to surroundings. Call light within reach. ID bands double-checked with receiving RN.  Response: Patient tolerated transfer and is stable.

## 2020-01-20 NOTE — ANESTHESIA PREPROCEDURE EVALUATION
Anesthesia Pre-Procedure Evaluation    Patient: Nicky Dorman   MRN: [id number] : 1980          Preoperative Diagnosis: PREVIOUS  SECTION    Procedure(s):   SECTION    Past Medical History:   Diagnosis Date     Anemia      Past Surgical History:   Procedure Laterality Date      SECTION        SECTION N/A 9/3/2018    Procedure:  SECTION;  REPEAT  SECTION;  Surgeon: Tamara Torres MD;  Location:  L+D     wisdom teeth         Anesthesia Evaluation     . Pt has had prior anesthetic.     No history of anesthetic complications          ROS/MED HX    ENT/Pulmonary:      (-) sleep apnea   Neurologic:       Cardiovascular:         METS/Exercise Tolerance:     Hematologic:         Musculoskeletal:         GI/Hepatic:        (-) GERD   Renal/Genitourinary:         Endo:         Psychiatric:         Infectious Disease:         Malignancy:         Other:                          Physical Exam  Normal systems: cardiovascular, pulmonary and dental    Airway   Mallampati: I  TM distance: >3 FB  Neck ROM: full    Dental     Cardiovascular   Rhythm and rate: regular and normal      Pulmonary    breath sounds clear to auscultation            Lab Results   Component Value Date    HGB 12.0 2020       Preop Vitals  BP Readings from Last 3 Encounters:   20 129/79   20 124/73   19 111/67    Pulse Readings from Last 3 Encounters:   20 79   19 83      Resp Readings from Last 3 Encounters:   20 18   20 18   19 16    SpO2 Readings from Last 3 Encounters:   20 99%   18 100%      Temp Readings from Last 1 Encounters:   20 36.9  C (98.4  F) (Temporal)    Ht Readings from Last 1 Encounters:   20 1.829 m (6')      Wt Readings from Last 1 Encounters:   20 83 kg (183 lb)    Estimated body mass index is 24.82 kg/m  as calculated from the following:    Height as of this encounter: 1.829 m (6').     Weight as of this encounter: 83 kg (183 lb).       Anesthesia Plan      History & Physical Review  History and physical reviewed and following examination; no interval change.    ASA Status:  2 emergent.    NPO Status:  Waived due to emergency    Plan for Spinal   PONV prophylaxis:  Ondansetron (or other 5HT-3)       Postoperative Care  Postoperative pain management:  Multi-modal analgesia.      Consents  Anesthetic plan, risks, benefits and alternatives discussed with:  Patient..                 Elizabeth Coffey MD, MD

## 2020-01-20 NOTE — PROGRESS NOTES
POD 1  S.  Doing well, able to void  O.  AVSS  /53   Pulse 53   Temp 98  F (36.7  C) (Oral)   Resp 16   Ht 1.829 m (6')   Wt 83 kg (183 lb)   LMP 05/02/2019   SpO2 100%   Breastfeeding Unknown   BMI 24.82 kg/m    Abd soft  Bandage dry  Recent Labs   Lab 01/20/20  0648 01/19/20 2016   HGB 10.5* 12.0   A.  Appropriate recovery  Anemia due to acute blood loss, tolerating well  P.  Remove bandage tonight or tomorrow; routine orders./LBakerMD

## 2020-01-20 NOTE — BRIEF OP NOTE
[facility] Brief Operative Note    Pre-operative diagnosis: PREVIOUS  SECTION X2  IUP at 37 3/7 weeks  Active labor  Marginal cord insertion  AMA   Post-operative diagnosis Same   Procedure: Procedure(s):   SECTION Repeat Low transverse  section  With 2 layer closure   Surgeon(s): Surgeon(s) and Role:     * Ann Alex MD - Primary   Asst: OR staff  Anesthesia: spinal  Drains: gomez  Complications: none     Estimated blood loss:     Specimens: Placenta discarded, cord  blood to lab   Findings: Thick keloid scar of skin with some adherence to fascia  Normal tubes,uterus and ovaries, no intra abdominal scar tissue  6# 8 oz male with apgars 8,9  Clear fluid.Nuchal cord x1

## 2020-01-21 PROCEDURE — 12000035 ZZH R&B POSTPARTUM

## 2020-01-21 PROCEDURE — 25000132 ZZH RX MED GY IP 250 OP 250 PS 637: Performed by: OBSTETRICS & GYNECOLOGY

## 2020-01-21 RX ORDER — OXYCODONE HYDROCHLORIDE 5 MG/1
5-10 TABLET ORAL
Qty: 16 TABLET | Refills: 0 | Status: SHIPPED | OUTPATIENT
Start: 2020-01-21

## 2020-01-21 RX ADMIN — IBUPROFEN 800 MG: 400 TABLET ORAL at 20:28

## 2020-01-21 RX ADMIN — OXYCODONE HYDROCHLORIDE 5 MG: 5 TABLET ORAL at 11:16

## 2020-01-21 RX ADMIN — IBUPROFEN 800 MG: 400 TABLET ORAL at 07:26

## 2020-01-21 RX ADMIN — IBUPROFEN 800 MG: 400 TABLET ORAL at 14:06

## 2020-01-21 RX ADMIN — ACETAMINOPHEN 975 MG: 325 TABLET, FILM COATED ORAL at 07:26

## 2020-01-21 RX ADMIN — OXYCODONE HYDROCHLORIDE 5 MG: 5 TABLET ORAL at 14:06

## 2020-01-21 RX ADMIN — OXYCODONE HYDROCHLORIDE 5 MG: 5 TABLET ORAL at 03:39

## 2020-01-21 RX ADMIN — ACETAMINOPHEN 975 MG: 325 TABLET, FILM COATED ORAL at 16:28

## 2020-01-21 NOTE — LACTATION NOTE
Follow up visit with Nicky & baby boy. Nicky reports feeding is going fair. She shared she has a history of low milk supply with both of her first two children, even with doing all the interventions with her first child. This time, she shared she is planning to breastfeed as baby will breastfeed, supplement with formula as needed, and declines pumping to stimulate milk supply. She states she is ok with her milk supply not being at maximum this time around as long as she can bring baby boy to breast as she can. Aware of possible interventions including pumping to maximize milk supply. Offered support and encouragement regarding providing breast milk as she has been able to. Reviewed milk supply. General questions answered regarding low supply, bottle feeding supplementation. Breastfeeding section reviewed in [facility] patient education booklet.    Feeding plan: Recommend unlimited, frequent breast feedings: At least 8 - 12 times every 24 hours. If supplementing, keep offering breast as able prior to supplementing. Avoid pacifiers. Encouraged use of feeding log and to record feedings, and void/stool patterns. Encouraged to call with needs, will revisit as needed. Nicky appreciative of visit.    Mercy Chandler, BSN, PHN, RN-C, MNN, IBCLC

## 2020-01-21 NOTE — PLAN OF CARE
Vital signs stable and up ambulating in room and independent with self and  cares.  Tylenol, Ibuprofen and Oxycodone 5mg  controlling pain well.  Bonding well with Kris.

## 2020-01-21 NOTE — PROGRESS NOTES
Po#2 afeb  Hgb 10.5  Comfortable  Ambulation encouraged  Good early recovery  Will discharge for tomorrow

## 2020-01-21 NOTE — PLAN OF CARE
Pt doing very well. VSS. Fundus firm, scant flow. 24 hrs last night, pneumo boots and pulse ox removed. Ambulating and voiding independently. IV removed. Infant cluster fed all of the night. Possible supplementation suggested. Completing infant cares independently. Taking tylenol, ibuprofen and oxy PRN for pain. Encouraged to drink fluids and ambulate. Will continue to monitor.

## 2020-01-22 VITALS
SYSTOLIC BLOOD PRESSURE: 115 MMHG | WEIGHT: 183 LBS | DIASTOLIC BLOOD PRESSURE: 66 MMHG | BODY MASS INDEX: 24.79 KG/M2 | RESPIRATION RATE: 16 BRPM | HEART RATE: 53 BPM | TEMPERATURE: 97.9 F | HEIGHT: 72 IN | OXYGEN SATURATION: 98 %

## 2020-01-22 PROCEDURE — 25000132 ZZH RX MED GY IP 250 OP 250 PS 637: Performed by: OBSTETRICS & GYNECOLOGY

## 2020-01-22 RX ADMIN — IBUPROFEN 800 MG: 400 TABLET ORAL at 03:55

## 2020-01-22 RX ADMIN — SENNOSIDES AND DOCUSATE SODIUM 1 TABLET: 8.6; 5 TABLET ORAL at 08:06

## 2020-01-22 RX ADMIN — ACETAMINOPHEN 975 MG: 325 TABLET, FILM COATED ORAL at 08:06

## 2020-01-22 RX ADMIN — IBUPROFEN 800 MG: 400 TABLET ORAL at 09:41

## 2020-01-22 RX ADMIN — OXYCODONE HYDROCHLORIDE 5 MG: 5 TABLET ORAL at 08:16

## 2020-01-22 RX ADMIN — ACETAMINOPHEN 975 MG: 325 TABLET, FILM COATED ORAL at 00:05

## 2020-01-22 NOTE — PLAN OF CARE
VSS. Fundus firm with scant flow. Incision open to air with a scant amount of dried drainage. Completing infant cares independently. Breastfeeding going well with occasional supplementation. Taking tylenol and ibuprofen for mild pain. Encouraged to call with questions. Will continue to monitor.

## 2020-01-22 NOTE — PLAN OF CARE
Discharge instructions and follow up reviewed with patient.  All questions answered at this time. Discharge to home with baby and .

## 2020-01-22 NOTE — PROGRESS NOTES
[facility] Obstetrics Post-Op Progress Note  2020    S: Doing well.  Pain is well controlled. Bleeding Moderate. Infant is being .  Ambulatory.  Tolerating regular diet. Voiding spontaneously. Passing gas, no BM yet.    O:  /66   Pulse 53   Temp 97.9  F (36.6  C) (Oral)   Resp 16   Ht 1.829 m (6')   Wt 83 kg (183 lb)   LMP 2019   SpO2 98%   Breastfeeding Unknown   BMI 24.82 kg/m     Gen: healthy, alert and no distress   Resp: Nonlabored breathing  Abd: soft, non-distended, appropriately TTP, FF at u  Incision: C/D/I, staples in place   Ext: non-tender, no edema    Hemoglobin   Date Value Ref Range Status   2020 10.5 (L) 11.7 - 15.7 g/dL Final   2020 12.0 11.7 - 15.7 g/dL Final     Lab Results   Component Value Date    ABO O 2020    RH Pos 2020    AS Neg 2020    RUBELLAABIGG 1.6 2019       A: 39 year old  POD#3 s/p R LTCS   P:   Routine post-operative care  Ambulation encouraged  Reportable signs and symptoms dicussed with the patient  Staples to be removed and Steri-Strips placed today  Discharge later today    Clara Ledesma, DO   Obstetrics, Gynecology, and Infertility

## 2020-01-22 NOTE — LACTATION NOTE
Routine visit with Nicky and sayda Ponce. Continues to nurse well per mom, then occasionally bottle feeds with Similac.  Has a pump at home if she changes her mind and decides to pump as well.   We discussed [employer] as another option.  Getting ready for discharge.  Plan: Watch for feeding cues and feed every 2-3 hours and/or on demand. Continue to use feeding log to track intake and appropriate voids and stools. Take feeding log to first follow up appointment or weight check. Encourage skin to skin to promote frequent feedings, thermoregulation and bonding. Follow-up with healthcare provider or lactation consultant for questions or concerns.     Instructed on signs/symptoms of engorgement/ plugged ducts and mastitis.  Instructed on comfort measures and when to call MD.   No further questions at this time.   Will follow as needed.   Trini Son BSN, RN, PHN, RNC-MNN, IBCLC

## 2020-01-22 NOTE — PLAN OF CARE
Pt educated on the benefits and risks of giving infant a pacifier. Pt showed understanding of of education and would like to continue with the use of a paci. Will continue to monitor.

## 2020-01-29 ENCOUNTER — DOCUMENTATION ONLY (OUTPATIENT)
Dept: CARE COORDINATION | Facility: CLINIC | Age: 40
End: 2020-01-29

## 2020-01-29 NOTE — PROGRESS NOTES
[facility] will be sharing updates with you on Maternal Child Health Referral requests for home care services.  This is for care coordination purposes and alert you to referral status.  We received the referral for  Nicky Dorman; MRN [id number] and want to update you:    [facility] has made 2 attempts to contact patient by phone and text message over the last 4 days.   We have not had any response from patient.  Final message was left advising patient to follow up with Primary Care Providers for mom and baby.     Sincerely [facility]  Caio Bernabe  [phone]

## 2020-02-06 NOTE — DISCHARGE SUMMARY
[facility] Discharge Summary    Nicky Dorman MRN# [id number]   Age: 39 year old YOB: 1980     Date of Admission:  2020  Date of Discharge::  2020 12:12 PM  Admitting Physician:  Ann Alex MD  Discharge Physician:  Ann Alex MD     Home clinic: Obstetrics, Gynecology, and Infertility          Admission Diagnoses:   1. Intrauterine pregnancy at 37w3d   2. Previous  section  X 2  Active labor  Marginal cord insertion           Discharge Diagnosis:   1.  delivery at 37w3d   2. Same          Procedures:   Repeat low transverse  section via pfannenstiel skin incision with 2 layer uterine closure           Medications Prior to Admission:     No medications prior to admission.             Discharge Medications:     Discharge Medication List as of 2020 10:07 AM      START taking these medications    Details   oxyCODONE (ROXICODONE) 5 MG tablet Take 1-2 tablets (5-10 mg) by mouth every 3 hours as needed, Disp-16 tablet, R-0, Local Print         CONTINUE these medications which have NOT CHANGED    Details   Ferrous Sulfate (IRON SUPPLEMENT PO) Take 325 mg by mouth daily (with breakfast), Historical      Prenatal Vit w/Vm-Ohkpecfrp-HW (PNV PO) Take 1 tablet by mouth daily, Historical         STOP taking these medications       nitroFURantoin macrocrystal-monohydrate (MACROBID) 100 MG capsule Comments:   Reason for Stopping:                      Brief History of Labor or Admission:   Nicky Dorman is a 39 year old  who was admitted at 37w3d for active labor with 2 previous  sections and planned repeat c/s.           Hospital Course:   The patient's hospital course was unremarkable.  She recovered as anticipated and experienced no post-operative complications.  On discharge, her pain was well controlled. Vaginal bleeding is similar to peak menstrual flow.  Voiding without difficulty.  Ambulating well and tolerating a normal diet.   No fever or significant wound drainage.  Breastfeeding going well  well.  Infant is stable.  No bowel movement yet.Passing flatus  She was discharged on post-partum day #3.     Post-partum hemoglobin:   Hemoglobin   Date Value Ref Range Status   01/20/2020 10.5 (L) 11.7 - 15.7 g/dL Final             Discharge Instructions and Follow-Up:   Discharge diet: Regular   Discharge activity: No heavy lifting, pushing, pulling for 6 week(s)  No driving or operating machinery while on narcotic analgesics  Pelvic rest: abstain from intercourse and do not use tampons for 6 week(s)   Discharge follow-up: Incision check in 2 weeks  Routine postpartum visit in 6 weeks   Wound care: Keep wound clean and dry  May shower but do not soak incision or scrub  Steri-strips off in 7 days             Discharge Disposition:   Discharged to home      Ann Alex MD   [facility]

## 2020-03-11 ENCOUNTER — HEALTH MAINTENANCE LETTER (OUTPATIENT)
Age: 40
End: 2020-03-11

## 2021-01-03 ENCOUNTER — HEALTH MAINTENANCE LETTER (OUTPATIENT)
Age: 41
End: 2021-01-03

## 2021-04-25 ENCOUNTER — HEALTH MAINTENANCE LETTER (OUTPATIENT)
Age: 41
End: 2021-04-25

## 2021-10-10 ENCOUNTER — HEALTH MAINTENANCE LETTER (OUTPATIENT)
Age: 41
End: 2021-10-10

## 2022-05-21 ENCOUNTER — HEALTH MAINTENANCE LETTER (OUTPATIENT)
Age: 42
End: 2022-05-21

## 2022-09-18 ENCOUNTER — HEALTH MAINTENANCE LETTER (OUTPATIENT)
Age: 42
End: 2022-09-18

## 2023-06-04 ENCOUNTER — HEALTH MAINTENANCE LETTER (OUTPATIENT)
Age: 43
End: 2023-06-04

## 2024-02-25 ENCOUNTER — HEALTH MAINTENANCE LETTER (OUTPATIENT)
Age: 44
End: 2024-02-25

## (undated) DEVICE — DRSG STERI STRIP 1/4X3" R1541

## (undated) DEVICE — BLADE CLIPPER 4406

## (undated) DEVICE — GLOVE PROTEXIS W/NEU-THERA 6.0  2D73TE60

## (undated) DEVICE — STPL SKIN PROXIMATE 35 WIDE PMW35

## (undated) DEVICE — SUCTION CANISTER MEDIVAC LINER 3000ML W/LID 65651-530

## (undated) DEVICE — PACK C-SECTION LF PL15OTA83B

## (undated) DEVICE — GLOVE PROTEXIS W/NEU-THERA 6.5  2D73TE65

## (undated) DEVICE — ESU GROUND PAD UNIVERSAL W/O CORD

## (undated) DEVICE — DRSG GAUZE 4X4" TOPPER

## (undated) DEVICE — SU VICRYL 0 CT-1 27" J340H

## (undated) DEVICE — SU VICRYL 0 CT 36" J358H

## (undated) DEVICE — SU VICRYL 3-0 CT-1 36" J338H

## (undated) DEVICE — SOL NACL 0.9% IRRIG 1000ML BOTTLE 07138-09

## (undated) DEVICE — SU CHROMIC 3-0 CT-1 27" 810H

## (undated) DEVICE — LINEN C-SECTION 5415

## (undated) DEVICE — GLOVE PROTEXIS W/NEU-THERA 7.0  2D73TE70

## (undated) DEVICE — CATH TRAY FOLEY 16FR BARDEX W/DRAIN BAG STATLOCK 300316A

## (undated) DEVICE — DRSG TELFA 3X8" 1238

## (undated) DEVICE — PREP CHLORAPREP 26ML TINTED ORANGE  260815

## (undated) DEVICE — GLOVE PROTEXIS BLUE W/NEU-THERA 6.5  2D73EB65

## (undated) DEVICE — SU VICRYL 3-0 SH 27" J316H

## (undated) DEVICE — SU MONOCRYL 0 CTX 36" Y398H